# Patient Record
Sex: MALE | Race: BLACK OR AFRICAN AMERICAN | URBAN - METROPOLITAN AREA
[De-identification: names, ages, dates, MRNs, and addresses within clinical notes are randomized per-mention and may not be internally consistent; named-entity substitution may affect disease eponyms.]

---

## 2023-08-30 ENCOUNTER — HOSPITAL ENCOUNTER (EMERGENCY)
Age: 53
Discharge: HOME | End: 2023-08-31
Attending: STUDENT IN AN ORGANIZED HEALTH CARE EDUCATION/TRAINING PROGRAM
Payer: MEDICAID

## 2023-08-30 DIAGNOSIS — F10.920 ALCOHOLIC INTOXICATION WITHOUT COMPLICATION (HCC): Primary | ICD-10-CM

## 2023-08-30 LAB
ALBUMIN SERPL-MCNC: 4.1 G/DL (ref 3.4–5)
ALBUMIN/GLOB SERPL: 1.1 {RATIO} (ref 1.1–2.2)
ALP SERPL-CCNC: 67 U/L (ref 40–129)
ALT SERPL-CCNC: 22 U/L (ref 10–40)
AMPHETAMINES UR QL SCN>1000 NG/ML: NORMAL
ANION GAP SERPL CALCULATED.3IONS-SCNC: 15 MMOL/L (ref 3–16)
AST SERPL-CCNC: 27 U/L (ref 15–37)
BARBITURATES UR QL SCN>200 NG/ML: NORMAL
BASOPHILS # BLD: 0 K/UL (ref 0–0.2)
BASOPHILS NFR BLD: 0.5 %
BENZODIAZ UR QL SCN>200 NG/ML: NORMAL
BILIRUB SERPL-MCNC: <0.2 MG/DL (ref 0–1)
BILIRUB UR QL STRIP.AUTO: NEGATIVE
BUN SERPL-MCNC: 14 MG/DL (ref 7–20)
CALCIUM SERPL-MCNC: 9.2 MG/DL (ref 8.3–10.6)
CANNABINOIDS UR QL SCN>50 NG/ML: NORMAL
CHLORIDE SERPL-SCNC: 98 MMOL/L (ref 99–110)
CLARITY UR: CLEAR
CO2 SERPL-SCNC: 21 MMOL/L (ref 21–32)
COCAINE UR QL SCN: NORMAL
COLOR UR: YELLOW
CREAT SERPL-MCNC: 0.9 MG/DL (ref 0.9–1.3)
DEPRECATED RDW RBC AUTO: 14.5 % (ref 12.4–15.4)
DRUG SCREEN COMMENT UR-IMP: NORMAL
EOSINOPHIL # BLD: 0 K/UL (ref 0–0.6)
EOSINOPHIL NFR BLD: 0.3 %
ETHANOLAMINE SERPL-MCNC: 389 MG/DL (ref 0–0.08)
FENTANYL SCREEN, URINE: NORMAL
GFR SERPLBLD CREATININE-BSD FMLA CKD-EPI: >60 ML/MIN/{1.73_M2}
GLUCOSE SERPL-MCNC: 106 MG/DL (ref 70–99)
GLUCOSE UR STRIP.AUTO-MCNC: NEGATIVE MG/DL
HCT VFR BLD AUTO: 32.5 % (ref 40.5–52.5)
HGB BLD-MCNC: 11 G/DL (ref 13.5–17.5)
HGB UR QL STRIP.AUTO: NEGATIVE
KETONES UR STRIP.AUTO-MCNC: NEGATIVE MG/DL
LEUKOCYTE ESTERASE UR QL STRIP.AUTO: NEGATIVE
LYMPHOCYTES # BLD: 1.4 K/UL (ref 1–5.1)
LYMPHOCYTES NFR BLD: 30.5 %
MCH RBC QN AUTO: 29.7 PG (ref 26–34)
MCHC RBC AUTO-ENTMCNC: 33.8 G/DL (ref 31–36)
MCV RBC AUTO: 87.8 FL (ref 80–100)
METHADONE UR QL SCN>300 NG/ML: NORMAL
MONOCYTES # BLD: 0.6 K/UL (ref 0–1.3)
MONOCYTES NFR BLD: 13.4 %
NEUTROPHILS # BLD: 2.6 K/UL (ref 1.7–7.7)
NEUTROPHILS NFR BLD: 55.3 %
NITRITE UR QL STRIP.AUTO: NEGATIVE
OPIATES UR QL SCN>300 NG/ML: NORMAL
OXYCODONE UR QL SCN: NORMAL
PCP UR QL SCN>25 NG/ML: NORMAL
PH UR STRIP.AUTO: 6.5 [PH] (ref 5–8)
PH UR STRIP: 6.5 [PH]
PLATELET # BLD AUTO: 184 K/UL (ref 135–450)
PMV BLD AUTO: 8.4 FL (ref 5–10.5)
POTASSIUM SERPL-SCNC: 4.2 MMOL/L (ref 3.5–5.1)
PROT SERPL-MCNC: 7.7 G/DL (ref 6.4–8.2)
PROT UR STRIP.AUTO-MCNC: NEGATIVE MG/DL
RBC # BLD AUTO: 3.7 M/UL (ref 4.2–5.9)
RBC #/AREA URNS HPF: NORMAL /HPF (ref 0–4)
SODIUM SERPL-SCNC: 134 MMOL/L (ref 136–145)
SP GR UR STRIP.AUTO: <=1.005 (ref 1–1.03)
TROPONIN, HIGH SENSITIVITY: 8 NG/L (ref 0–22)
UA DIPSTICK W REFLEX MICRO PNL UR: NORMAL
URN SPEC COLLECT METH UR: NORMAL
UROBILINOGEN UR STRIP-ACNC: 0.2 E.U./DL
WBC # BLD AUTO: 4.6 K/UL (ref 4–11)
WBC #/AREA URNS HPF: NORMAL /HPF (ref 0–5)

## 2023-08-30 PROCEDURE — 99284 EMERGENCY DEPT VISIT MOD MDM: CPT

## 2023-08-30 PROCEDURE — 82077 ASSAY SPEC XCP UR&BREATH IA: CPT

## 2023-08-30 PROCEDURE — 6360000002 HC RX W HCPCS: Performed by: STUDENT IN AN ORGANIZED HEALTH CARE EDUCATION/TRAINING PROGRAM

## 2023-08-30 PROCEDURE — 96372 THER/PROPH/DIAG INJ SC/IM: CPT

## 2023-08-30 PROCEDURE — 2580000003 HC RX 258: Performed by: STUDENT IN AN ORGANIZED HEALTH CARE EDUCATION/TRAINING PROGRAM

## 2023-08-30 PROCEDURE — 85025 COMPLETE CBC W/AUTO DIFF WBC: CPT

## 2023-08-30 PROCEDURE — 96360 HYDRATION IV INFUSION INIT: CPT

## 2023-08-30 PROCEDURE — 84484 ASSAY OF TROPONIN QUANT: CPT

## 2023-08-30 PROCEDURE — 81001 URINALYSIS AUTO W/SCOPE: CPT

## 2023-08-30 PROCEDURE — 80053 COMPREHEN METABOLIC PANEL: CPT

## 2023-08-30 PROCEDURE — 36415 COLL VENOUS BLD VENIPUNCTURE: CPT

## 2023-08-30 RX ORDER — SODIUM CHLORIDE, SODIUM LACTATE, POTASSIUM CHLORIDE, AND CALCIUM CHLORIDE .6; .31; .03; .02 G/100ML; G/100ML; G/100ML; G/100ML
1000 INJECTION, SOLUTION INTRAVENOUS ONCE
Status: COMPLETED | OUTPATIENT
Start: 2023-08-30 | End: 2023-08-30

## 2023-08-30 RX ORDER — HALOPERIDOL 5 MG/ML
5 INJECTION INTRAMUSCULAR ONCE
Status: COMPLETED | OUTPATIENT
Start: 2023-08-30 | End: 2023-08-30

## 2023-08-30 RX ORDER — MIDAZOLAM HYDROCHLORIDE 1 MG/ML
5 INJECTION, SOLUTION INTRAMUSCULAR; INTRAVENOUS ONCE
Status: DISCONTINUED | OUTPATIENT
Start: 2023-08-30 | End: 2023-08-31

## 2023-08-30 RX ADMIN — SODIUM CHLORIDE, SODIUM LACTATE, POTASSIUM CHLORIDE, AND CALCIUM CHLORIDE 1000 ML: .6; .31; .03; .02 INJECTION, SOLUTION INTRAVENOUS at 22:45

## 2023-08-30 RX ADMIN — HALOPERIDOL LACTATE 5 MG: 5 INJECTION, SOLUTION INTRAMUSCULAR at 21:15

## 2023-08-30 RX ADMIN — HALOPERIDOL LACTATE 5 MG: 5 INJECTION, SOLUTION INTRAMUSCULAR at 20:45

## 2023-08-30 ASSESSMENT — PAIN - FUNCTIONAL ASSESSMENT: PAIN_FUNCTIONAL_ASSESSMENT: NONE - DENIES PAIN

## 2023-08-31 VITALS
HEART RATE: 77 BPM | WEIGHT: 162.6 LBS | OXYGEN SATURATION: 94 % | BODY MASS INDEX: 24.08 KG/M2 | RESPIRATION RATE: 18 BRPM | HEIGHT: 69 IN | DIASTOLIC BLOOD PRESSURE: 77 MMHG | SYSTOLIC BLOOD PRESSURE: 100 MMHG | TEMPERATURE: 98.1 F

## 2023-08-31 NOTE — ED NOTES
Pt discharged from ED in stable condition. Discharge instruction explain, all question answered. Prescription given. Pt walked to lobby independently. Patient Mother was contact and informed that patient was discharge.       Davis Cortes RN  08/31/23 2483

## 2023-08-31 NOTE — ED PROVIDER NOTES
ED Attending Attestation Note     Date of evaluation: 8/30/2023    This patient was seen by the resident. I have seen and examined the patient, agree with the workup, evaluation, management and diagnosis. The care plan has been discussed. I have reviewed the ECG and concur with the resident's interpretation. This is a patient with unknown past medical history of an unknown age who was found down with reported EtOH and other potential recreational drugs on board. Patient is unable to provide medical history as he is grossly altered at this time. Considerations include polysubstance intoxication, seizure with postictal state, trauma, primary psychiatric disease with psychosis. Patient is very agitated and required multiple doses of chemical sedation agents to maintain patient and staff safety. On exam, he is awake and alert but obviously responding to internal stimuli, he moves all 4 extremities spontaneously. Pupils are normal and briskly reactive bilaterally. CRITICAL CARE TIME    I have seen and examined this patient and provided 31 minutes of critical care time exclusive of separately billed procedures and treating other patients.   Critical care was necessary to treat or prevent imminent or life threatening deterioration of the following condition(s): Encephalopathy, agitation with psychosis    Critical care time was spent personally by me on the following activities: Evaluation of the patient, discussions with primary provider, administration of multiple pharmaceutical agents for management of agitation and psychosis, evaluation and response to therapy    MD Debra Brandt MD  08/30/23 8374

## 2023-08-31 NOTE — DISCHARGE INSTRUCTIONS
Do not drink alcohol to excess. This can be dangerous and possibly fatal.  Follow-up with your primary care provider from [facility].

## 2024-08-17 ENCOUNTER — APPOINTMENT (OUTPATIENT)
Dept: GENERAL RADIOLOGY | Age: 54
DRG: 053 | End: 2024-08-17
Payer: MEDICAID

## 2024-08-17 ENCOUNTER — HOSPITAL ENCOUNTER (INPATIENT)
Age: 54
LOS: 2 days | Discharge: HOME | DRG: 053 | End: 2024-08-19
Attending: INTERNAL MEDICINE | Admitting: INTERNAL MEDICINE
Payer: MEDICAID

## 2024-08-17 DIAGNOSIS — R56.9 SEIZURE (HCC): Primary | ICD-10-CM

## 2024-08-17 LAB
ALBUMIN SERPL-MCNC: 4 G/DL (ref 3.4–5)
ALBUMIN/GLOB SERPL: 1.1 {RATIO} (ref 1.1–2.2)
ALP SERPL-CCNC: 60 U/L (ref 40–129)
ALT SERPL-CCNC: 36 U/L (ref 10–40)
ANION GAP SERPL CALCULATED.3IONS-SCNC: 14 MMOL/L (ref 3–16)
AST SERPL-CCNC: 21 U/L (ref 15–37)
BASOPHILS # BLD: 0 K/UL (ref 0–0.2)
BASOPHILS NFR BLD: 0.3 %
BILIRUB SERPL-MCNC: <0.2 MG/DL (ref 0–1)
BUN SERPL-MCNC: 15 MG/DL (ref 7–20)
CALCIUM SERPL-MCNC: 8.6 MG/DL (ref 8.3–10.6)
CHLORIDE SERPL-SCNC: 103 MMOL/L (ref 99–110)
CO2 SERPL-SCNC: 19 MMOL/L (ref 21–32)
CREAT SERPL-MCNC: 1.2 MG/DL (ref 0.9–1.3)
DEPRECATED RDW RBC AUTO: 14.7 % (ref 12.4–15.4)
EOSINOPHIL # BLD: 0 K/UL (ref 0–0.6)
EOSINOPHIL NFR BLD: 0 %
ETHANOLAMINE SERPL-MCNC: 10 MG/DL (ref 0–0.08)
GFR SERPLBLD CREATININE-BSD FMLA CKD-EPI: 72 ML/MIN/{1.73_M2}
GLUCOSE SERPL-MCNC: 123 MG/DL (ref 70–99)
HCT VFR BLD AUTO: 44.8 % (ref 40.5–52.5)
HGB BLD-MCNC: 14.5 G/DL (ref 13.5–17.5)
LACTATE BLDV-SCNC: 1.7 MMOL/L (ref 0.4–2)
LEVETIRACETAM SERPL-MCNC: <2 UG/ML (ref 6–46)
LYMPHOCYTES # BLD: 0.3 K/UL (ref 1–5.1)
LYMPHOCYTES NFR BLD: 4.4 %
MCH RBC QN AUTO: 28.1 PG (ref 26–34)
MCHC RBC AUTO-ENTMCNC: 32.5 G/DL (ref 31–36)
MCV RBC AUTO: 86.3 FL (ref 80–100)
MEDICATION DOSE-MCNC: ABNORMAL
MONOCYTES # BLD: 0.6 K/UL (ref 0–1.3)
MONOCYTES NFR BLD: 8.4 %
NEUTROPHILS # BLD: 6.6 K/UL (ref 1.7–7.7)
NEUTROPHILS NFR BLD: 86.9 %
PLATELET # BLD AUTO: 191 K/UL (ref 135–450)
PMV BLD AUTO: 10 FL (ref 5–10.5)
POTASSIUM SERPL-SCNC: 4 MMOL/L (ref 3.5–5.1)
PROT SERPL-MCNC: 7.6 G/DL (ref 6.4–8.2)
RBC # BLD AUTO: 5.18 M/UL (ref 4.2–5.9)
SODIUM SERPL-SCNC: 136 MMOL/L (ref 136–145)
VALPROATE SERPL-MCNC: 16 UG/ML (ref 50–100)
WBC # BLD AUTO: 7.6 K/UL (ref 4–11)

## 2024-08-17 PROCEDURE — 2060000000 HC ICU INTERMEDIATE R&B

## 2024-08-17 PROCEDURE — 99285 EMERGENCY DEPT VISIT HI MDM: CPT

## 2024-08-17 PROCEDURE — 82077 ASSAY SPEC XCP UR&BREATH IA: CPT

## 2024-08-17 PROCEDURE — 80053 COMPREHEN METABOLIC PANEL: CPT

## 2024-08-17 PROCEDURE — 80203 DRUG SCREEN QUANT ZONISAMIDE: CPT

## 2024-08-17 PROCEDURE — 95717 EEG PHYS/QHP 2-12 HR W/O VID: CPT | Performed by: PSYCHIATRY & NEUROLOGY

## 2024-08-17 PROCEDURE — 80177 DRUG SCRN QUAN LEVETIRACETAM: CPT

## 2024-08-17 PROCEDURE — 71045 X-RAY EXAM CHEST 1 VIEW: CPT

## 2024-08-17 PROCEDURE — 83605 ASSAY OF LACTIC ACID: CPT

## 2024-08-17 PROCEDURE — 85025 COMPLETE CBC W/AUTO DIFF WBC: CPT

## 2024-08-17 PROCEDURE — 6360000002 HC RX W HCPCS: Performed by: PHYSICIAN ASSISTANT

## 2024-08-17 PROCEDURE — 36415 COLL VENOUS BLD VENIPUNCTURE: CPT

## 2024-08-17 PROCEDURE — 96375 TX/PRO/DX INJ NEW DRUG ADDON: CPT

## 2024-08-17 PROCEDURE — 6360000002 HC RX W HCPCS

## 2024-08-17 PROCEDURE — 96374 THER/PROPH/DIAG INJ IV PUSH: CPT

## 2024-08-17 PROCEDURE — 80164 ASSAY DIPROPYLACETIC ACD TOT: CPT

## 2024-08-17 RX ORDER — LORAZEPAM 2 MG/ML
INJECTION INTRAMUSCULAR
Status: COMPLETED
Start: 2024-08-17 | End: 2024-08-17

## 2024-08-17 RX ORDER — LORAZEPAM 2 MG/ML
1 INJECTION INTRAMUSCULAR
Status: ACTIVE | OUTPATIENT
Start: 2024-08-17 | End: 2024-08-18

## 2024-08-17 RX ORDER — LORAZEPAM 0.5 MG/1
0.5 TABLET ORAL EVERY 6 HOURS PRN
Status: DISCONTINUED | OUTPATIENT
Start: 2024-08-17 | End: 2024-08-19 | Stop reason: HOSPADM

## 2024-08-17 RX ORDER — LORAZEPAM 0.5 MG/1
0.5 TABLET ORAL 3 TIMES DAILY
COMMUNITY

## 2024-08-17 RX ORDER — SODIUM CHLORIDE 9 MG/ML
INJECTION, SOLUTION INTRAVENOUS PRN
Status: DISCONTINUED | OUTPATIENT
Start: 2024-08-17 | End: 2024-08-19 | Stop reason: HOSPADM

## 2024-08-17 RX ORDER — ZONISAMIDE 100 MG/1
200 CAPSULE ORAL NIGHTLY
COMMUNITY

## 2024-08-17 RX ORDER — DIVALPROEX SODIUM 250 MG/1
250 TABLET, EXTENDED RELEASE ORAL 2 TIMES DAILY
Status: DISCONTINUED | OUTPATIENT
Start: 2024-08-17 | End: 2024-08-17

## 2024-08-17 RX ORDER — LORAZEPAM 2 MG/ML
1 INJECTION INTRAMUSCULAR
COMMUNITY

## 2024-08-17 RX ORDER — ERGOCALCIFEROL 1.25 MG/1
50000 CAPSULE ORAL WEEKLY
Status: DISCONTINUED | OUTPATIENT
Start: 2024-08-23 | End: 2024-08-19 | Stop reason: HOSPADM

## 2024-08-17 RX ORDER — ERGOCALCIFEROL 1.25 MG/1
50000 CAPSULE ORAL WEEKLY
COMMUNITY

## 2024-08-17 RX ORDER — SODIUM CHLORIDE 0.9 % (FLUSH) 0.9 %
5-40 SYRINGE (ML) INJECTION EVERY 12 HOURS SCHEDULED
Status: DISCONTINUED | OUTPATIENT
Start: 2024-08-17 | End: 2024-08-19 | Stop reason: HOSPADM

## 2024-08-17 RX ORDER — POTASSIUM CHLORIDE 7.45 MG/ML
10 INJECTION INTRAVENOUS PRN
Status: DISCONTINUED | OUTPATIENT
Start: 2024-08-17 | End: 2024-08-19 | Stop reason: HOSPADM

## 2024-08-17 RX ORDER — SODIUM CHLORIDE 0.9 % (FLUSH) 0.9 %
5-40 SYRINGE (ML) INJECTION PRN
Status: DISCONTINUED | OUTPATIENT
Start: 2024-08-17 | End: 2024-08-19 | Stop reason: HOSPADM

## 2024-08-17 RX ORDER — ALPRAZOLAM 0.5 MG/1
1 TABLET ORAL
Status: DISCONTINUED | OUTPATIENT
Start: 2024-08-17 | End: 2024-08-17 | Stop reason: ALTCHOICE

## 2024-08-17 RX ORDER — MAGNESIUM SULFATE IN WATER 40 MG/ML
2000 INJECTION, SOLUTION INTRAVENOUS PRN
Status: DISCONTINUED | OUTPATIENT
Start: 2024-08-17 | End: 2024-08-19 | Stop reason: HOSPADM

## 2024-08-17 RX ORDER — LEVETIRACETAM 500 MG/5ML
500 INJECTION, SOLUTION, CONCENTRATE INTRAVENOUS EVERY 12 HOURS
Status: DISCONTINUED | OUTPATIENT
Start: 2024-08-17 | End: 2024-08-19

## 2024-08-17 RX ORDER — LEVETIRACETAM 500 MG/5ML
1000 INJECTION, SOLUTION, CONCENTRATE INTRAVENOUS ONCE
Status: COMPLETED | OUTPATIENT
Start: 2024-08-17 | End: 2024-08-17

## 2024-08-17 RX ORDER — ACETAMINOPHEN 325 MG/1
650 TABLET ORAL EVERY 6 HOURS PRN
Status: DISCONTINUED | OUTPATIENT
Start: 2024-08-17 | End: 2024-08-19 | Stop reason: HOSPADM

## 2024-08-17 RX ORDER — LEVETIRACETAM 500 MG/1
500 TABLET ORAL 2 TIMES DAILY
COMMUNITY

## 2024-08-17 RX ORDER — DEXAMETHASONE 4 MG/1
6 TABLET ORAL DAILY
Status: DISCONTINUED | OUTPATIENT
Start: 2024-08-18 | End: 2024-08-17

## 2024-08-17 RX ORDER — LEVETIRACETAM 10 MG/ML
1000 INJECTION INTRAVASCULAR ONCE
Status: DISCONTINUED | OUTPATIENT
Start: 2024-08-17 | End: 2024-08-17

## 2024-08-17 RX ORDER — ACETAMINOPHEN 650 MG/1
650 SUPPOSITORY RECTAL EVERY 6 HOURS PRN
Status: DISCONTINUED | OUTPATIENT
Start: 2024-08-17 | End: 2024-08-19 | Stop reason: HOSPADM

## 2024-08-17 RX ORDER — LORAZEPAM 2 MG/ML
2 INJECTION INTRAMUSCULAR ONCE
Status: COMPLETED | OUTPATIENT
Start: 2024-08-17 | End: 2024-08-17

## 2024-08-17 RX ORDER — DIVALPROEX SODIUM 250 MG/1
250 TABLET, EXTENDED RELEASE ORAL 2 TIMES DAILY
COMMUNITY

## 2024-08-17 RX ORDER — ENOXAPARIN SODIUM 100 MG/ML
40 INJECTION SUBCUTANEOUS DAILY
Status: DISCONTINUED | OUTPATIENT
Start: 2024-08-18 | End: 2024-08-19 | Stop reason: HOSPADM

## 2024-08-17 RX ORDER — TRAZODONE HYDROCHLORIDE 50 MG/1
50 TABLET ORAL NIGHTLY
Status: DISCONTINUED | OUTPATIENT
Start: 2024-08-17 | End: 2024-08-19 | Stop reason: HOSPADM

## 2024-08-17 RX ORDER — LORAZEPAM 1 MG/1
2 TABLET ORAL ONCE
Status: DISCONTINUED | OUTPATIENT
Start: 2024-08-17 | End: 2024-08-17

## 2024-08-17 RX ORDER — ALPRAZOLAM 1 MG/1
1 TABLET ORAL
Status: ON HOLD | COMMUNITY
End: 2024-08-17

## 2024-08-17 RX ORDER — POLYETHYLENE GLYCOL 3350 17 G/17G
17 POWDER, FOR SOLUTION ORAL DAILY PRN
Status: DISCONTINUED | OUTPATIENT
Start: 2024-08-17 | End: 2024-08-19 | Stop reason: HOSPADM

## 2024-08-17 RX ORDER — POTASSIUM CHLORIDE 20 MEQ/1
40 TABLET, EXTENDED RELEASE ORAL PRN
Status: DISCONTINUED | OUTPATIENT
Start: 2024-08-17 | End: 2024-08-19 | Stop reason: HOSPADM

## 2024-08-17 RX ORDER — ZONISAMIDE 100 MG/1
200 CAPSULE ORAL NIGHTLY
Status: DISCONTINUED | OUTPATIENT
Start: 2024-08-17 | End: 2024-08-19 | Stop reason: HOSPADM

## 2024-08-17 RX ORDER — ONDANSETRON 2 MG/ML
4 INJECTION INTRAMUSCULAR; INTRAVENOUS EVERY 6 HOURS PRN
Status: DISCONTINUED | OUTPATIENT
Start: 2024-08-17 | End: 2024-08-19 | Stop reason: HOSPADM

## 2024-08-17 RX ORDER — LEVETIRACETAM 500 MG/1
500 TABLET ORAL 2 TIMES DAILY
Status: DISCONTINUED | OUTPATIENT
Start: 2024-08-17 | End: 2024-08-17

## 2024-08-17 RX ORDER — DEXAMETHASONE SODIUM PHOSPHATE 10 MG/ML
6 INJECTION, SOLUTION INTRAMUSCULAR; INTRAVENOUS DAILY
Status: DISCONTINUED | OUTPATIENT
Start: 2024-08-18 | End: 2024-08-19

## 2024-08-17 RX ORDER — ONDANSETRON 4 MG/1
4 TABLET, ORALLY DISINTEGRATING ORAL EVERY 8 HOURS PRN
Status: DISCONTINUED | OUTPATIENT
Start: 2024-08-17 | End: 2024-08-19 | Stop reason: HOSPADM

## 2024-08-17 RX ORDER — TRAZODONE HYDROCHLORIDE 50 MG/1
50 TABLET ORAL NIGHTLY
COMMUNITY

## 2024-08-17 RX ORDER — DEXAMETHASONE SODIUM PHOSPHATE 4 MG/ML
6 INJECTION, SOLUTION INTRA-ARTICULAR; INTRALESIONAL; INTRAMUSCULAR; INTRAVENOUS; SOFT TISSUE ONCE
Status: COMPLETED | OUTPATIENT
Start: 2024-08-17 | End: 2024-08-17

## 2024-08-17 RX ADMIN — LORAZEPAM 2 MG: 2 INJECTION INTRAMUSCULAR at 15:40

## 2024-08-17 RX ADMIN — DEXAMETHASONE SODIUM PHOSPHATE 6 MG: 4 INJECTION INTRA-ARTICULAR; INTRALESIONAL; INTRAMUSCULAR; INTRAVENOUS; SOFT TISSUE at 20:11

## 2024-08-17 RX ADMIN — LEVETIRACETAM 1000 MG: 100 INJECTION INTRAVENOUS at 17:51

## 2024-08-17 RX ADMIN — LORAZEPAM 2 MG: 2 INJECTION INTRAMUSCULAR; INTRAVENOUS at 15:40

## 2024-08-17 ASSESSMENT — PAIN - FUNCTIONAL ASSESSMENT: PAIN_FUNCTIONAL_ASSESSMENT: NONE - DENIES PAIN

## 2024-08-17 NOTE — ED NOTES
Headband: adjusted  Date/Time: 08/17/2024 @ 1547  Recorder: recording restarted  Skin: intact  Highest Seizure [place] Percentage past hour: 30%      General info regarding Seizure [place] %:  Minimum duration of study is 2 hours. If Seizure [place] has remained 0% throughout the entire 2-hour duration, communicate with provider to stop the recording.     Seizure [place] 0-10% - Continue to monitor and complete 2-hour study.  Seizure [place] 11-89% - Epileptiform activity present. Notify provider for next steps.  Seizure [place] >/= 90% - Epileptiform activity consistent with Status Epilepticus. Immediately notify provider.     *Patients with Seizure [place] above 10% that persists may require a study longer than 2 hours. Maximum recording duration is 24 hours. Please update provider with a persistent increase in Seizure [place] above 10%.

## 2024-08-17 NOTE — ED NOTES
Pt unwilling to let this RN catheterize patient for urine sample.    Pt oxygen saturation 87% RA while sleeping. Pt unwilling to let this RN place nasal cannula on.

## 2024-08-17 NOTE — ED PROVIDER NOTES
**ADVANCED PRACTICE PROVIDER, I HAVE EVALUATED THIS PATIENT**        [facility] EMERGENCY DEPARTMENT  EMERGENCY DEPARTMENT ENCOUNTER      Pt Name: Francisco Espinoza  MRN:[id number]  Birthdate 1970  Date of evaluation: 8/17/2024  Provider: Chidi Duckworth PA-C  Note Started: 2:07 PM EDT 8/17/24        Chief Complaint:    Chief Complaint   Patient presents with    Seizures     C/o 60 second seizure with postictal phase. Pt was recently diagnosed with COVID two days ago. Pt is not postictal in triage but does not verbally answer questions. Pt hx includes TBI, epilepsy, ETOH dependence, schizophrenia and cognitive delay. Recent admission for ETOH withdrawal and seizure activity.         Nursing Notes, Past Medical Hx, Past Surgical Hx, Social Hx, Allergies, and Family Hx were all reviewed and agreed with or any disagreements were addressed in the HPI.    HPI: (Location, Duration, Timing, Severity, Quality, Assoc Sx, Context, Modifying factors)    History From: Nursing home and EMS  Limitations to history : None    Social Determinants Significantly Affecting Health : None    Chief Complaint of seizure.  Patient had a seizure that lasted about a minute.  He is from [facility].  Also recently diagnosed with COVID 2 days ago.  He he denies headache, no chest pain, no nausea vomiting.  No lightheadedness.  No abdominal pain.  He will look up and she.  But he will not say much.  History of TBI, cerebral palsy, epilepsy, neurocognitive disorder and simple schizophrenia.  He is on zonisamide and Keppra for his seizures.    This is a  54 y.o. male who presents to emergency room with the above complaint    PastMedical/Surgical History:      Diagnosis Date    Acute alcoholism (HCC)     Anemia, unspecified     Anxiety disorder     Avitaminosis D     Cerebral palsy (HCC)     Epilepsy with altered consciousness with intractable epilepsy (HCC)     Insomnia, unspecified     Neurocognitive disorder

## 2024-08-17 NOTE — ED TRIAGE NOTES
Pt to ER from [facility] via EMS C/o 60 second seizure with postictal phase. Pt was recently diagnosed with COVID two days ago. Pt is not postictal in triage but does not verbally answer questions. Pt hx includes TBI, epilepsy, ETOH dependence, schizophrenia and cognitive delay. Recent admission for ETOH withdrawal and seizure activity.

## 2024-08-17 NOTE — PROGRESS NOTES
Pharmacy Medication Reconciliation Note     List of medications patient is currently taking is NOT complete.    Source of information:   1. [facility] Summary    Notes regarding home medications:   Medication list/ doses are accurate with summary list. Could not get a hold of a nurse to see when medications were last taken.      Mitzy Orozco, Pharmacy Intern  8/17/2024 5:38 PM

## 2024-08-17 NOTE — ED NOTES
Headband: applied  Date/Time: 08/17/2024 @ 1432  Recorder: recording started  Skin: intact  Highest Seizure [place] Percentage past hour:       General info regarding Seizure [place] %:  Minimum duration of study is 2 hours. If Seizure [place] has remained 0% throughout the entire 2-hour duration, communicate with provider to stop the recording.     Seizure [place] 0-10% - Continue to monitor and complete 2-hour study.  Seizure [place] 11-89% - Epileptiform activity present. Notify provider for next steps.  Seizure [place] >/= 90% - Epileptiform activity consistent with Status Epilepticus. Immediately notify provider.     *Patients with Seizure [place] above 10% that persists may require a study longer than 2 hours. Maximum recording duration is 24 hours. Please update provider with a persistent increase in Seizure [place] above 10%.

## 2024-08-17 NOTE — PROGRESS NOTES
Hospital Medicine History & Physical      PCP: No primary care provider on file.    Date of Admission: 8/17/2024    Date of Service: Pt seen/examined on 08/17/2024 and Admitted to Inpatient with expected LOS greater than two midnights due to medical therapy.     Chief Complaint: Seizure      History Of Present Illness:    54 y.o. male who presented to [facility] with seizures, patient from group home apparently had an episode of seizure there transferred to ED had another episode patient tested positive for COVID couple days ago on presentation his oxygen is 87 to 88% patient very poor historian he follows very simple commands but most of the command he does not follow to history taking history was taken from provider and chart, neurology were consulted by ED provider and okay to admit at [facility], will admit further to the hospital for further management and treatment.    Past Medical History:          Diagnosis Date    Acute alcoholism ([facility])     Anemia, unspecified     Anxiety disorder     Avitaminosis D     Cerebral palsy ([facility])     Epilepsy with altered consciousness with intractable epilepsy ([facility])     Insomnia, unspecified     Neurocognitive disorder     Noncompliance with medication regimen     Psychoactive substance abuse ([facility])     Simple schizophrenia, subchronic condition ([facility])     TBI (traumatic brain injury) ([facility])        Past Surgical History:      History reviewed. No pertinent surgical history.    Medications Prior to Admission:      Prior to Admission medications    Medication Sig Start Date End Date Taking? Authorizing Provider   ALPRAZolam (XANAX) 1 MG tablet Take 1 tablet by mouth every 16 hours. Indications: Seizure 1 tablet every 15 hours as needed for repeat x2 for acute seizure   Yes Wily Kirby MD   LORazepam (ATIVAN) 0.5 MG tablet Take 1 tablet by mouth in the morning, at noon, and at bedtime. Max Daily Amount: 1.5 mg   Yes Wily Kirby MD   LORazepam (ATIVAN) 2 MG/ML

## 2024-08-17 NOTE — ED NOTES
This RN heard snoring respirations from hallway and went to check pt. Pt was actively having a seizure, with eyes rolled back in head, snoring respirations, oxygen saturation drop, tachycardic in the 150s and slobbering. Staff assist button pushed by this RN, Charge RN in room. Pt turned on side and suctioned. Ativan per verbal order given (SEE MAR). Pt postictal at this time. 2L nasal cannula placed on patient. Seizure lasted approximately 30 seconds. Ceribell replaced and restarted.

## 2024-08-18 LAB
25(OH)D3 SERPL-MCNC: 40 NG/ML
ALBUMIN SERPL-MCNC: 4.3 G/DL (ref 3.4–5)
ALBUMIN/GLOB SERPL: 1.2 {RATIO} (ref 1.1–2.2)
ALP SERPL-CCNC: 57 U/L (ref 40–129)
ALT SERPL-CCNC: 36 U/L (ref 10–40)
AMPHETAMINES UR QL SCN>1000 NG/ML: ABNORMAL
ANION GAP SERPL CALCULATED.3IONS-SCNC: 14 MMOL/L (ref 3–16)
AST SERPL-CCNC: 27 U/L (ref 15–37)
BACTERIA URNS QL MICRO: NORMAL /HPF
BARBITURATES UR QL SCN>200 NG/ML: ABNORMAL
BASOPHILS # BLD: 0 K/UL (ref 0–0.2)
BASOPHILS NFR BLD: 0.3 %
BENZODIAZ UR QL SCN>200 NG/ML: POSITIVE
BILIRUB SERPL-MCNC: 0.3 MG/DL (ref 0–1)
BILIRUB UR QL STRIP.AUTO: NEGATIVE
BUN SERPL-MCNC: 18 MG/DL (ref 7–20)
CALCIUM SERPL-MCNC: 8.9 MG/DL (ref 8.3–10.6)
CANNABINOIDS UR QL SCN>50 NG/ML: POSITIVE
CHLORIDE SERPL-SCNC: 106 MMOL/L (ref 99–110)
CLARITY UR: CLEAR
CO2 SERPL-SCNC: 21 MMOL/L (ref 21–32)
COCAINE UR QL SCN: ABNORMAL
COLOR UR: YELLOW
CREAT SERPL-MCNC: 1.1 MG/DL (ref 0.9–1.3)
CRP SERPL-MCNC: 49.3 MG/L (ref 0–5.1)
DEPRECATED RDW RBC AUTO: 14.5 % (ref 12.4–15.4)
DRUG SCREEN COMMENT UR-IMP: ABNORMAL
EOSINOPHIL # BLD: 0 K/UL (ref 0–0.6)
EOSINOPHIL NFR BLD: 0 %
EPI CELLS #/AREA URNS AUTO: 1 /HPF (ref 0–5)
FENTANYL SCREEN, URINE: ABNORMAL
GFR SERPLBLD CREATININE-BSD FMLA CKD-EPI: 80 ML/MIN/{1.73_M2}
GLUCOSE BLD-MCNC: 84 MG/DL (ref 70–99)
GLUCOSE BLD-MCNC: 86 MG/DL (ref 70–99)
GLUCOSE BLD-MCNC: 95 MG/DL (ref 70–99)
GLUCOSE SERPL-MCNC: 108 MG/DL (ref 70–99)
GLUCOSE UR STRIP.AUTO-MCNC: NEGATIVE MG/DL
HCT VFR BLD AUTO: 43.1 % (ref 40.5–52.5)
HGB BLD-MCNC: 14.5 G/DL (ref 13.5–17.5)
HGB UR QL STRIP.AUTO: NEGATIVE
HYALINE CASTS #/AREA URNS AUTO: 1 /LPF (ref 0–8)
KETONES UR STRIP.AUTO-MCNC: ABNORMAL MG/DL
LEUKOCYTE ESTERASE UR QL STRIP.AUTO: NEGATIVE
LYMPHOCYTES # BLD: 0.9 K/UL (ref 1–5.1)
LYMPHOCYTES NFR BLD: 18.9 %
MCH RBC QN AUTO: 28.5 PG (ref 26–34)
MCHC RBC AUTO-ENTMCNC: 33.6 G/DL (ref 31–36)
MCV RBC AUTO: 84.9 FL (ref 80–100)
METHADONE UR QL SCN>300 NG/ML: ABNORMAL
MONOCYTES # BLD: 0.5 K/UL (ref 0–1.3)
MONOCYTES NFR BLD: 10 %
NEUTROPHILS # BLD: 3.5 K/UL (ref 1.7–7.7)
NEUTROPHILS NFR BLD: 70.8 %
NITRITE UR QL STRIP.AUTO: NEGATIVE
OPIATES UR QL SCN>300 NG/ML: ABNORMAL
OXYCODONE UR QL SCN: ABNORMAL
PCP UR QL SCN>25 NG/ML: ABNORMAL
PERFORMED ON: NORMAL
PH UR STRIP.AUTO: 5.5 [PH] (ref 5–8)
PH UR STRIP: 6 [PH]
PLATELET # BLD AUTO: 172 K/UL (ref 135–450)
PMV BLD AUTO: 9.4 FL (ref 5–10.5)
POTASSIUM SERPL-SCNC: 4.2 MMOL/L (ref 3.5–5.1)
PROT SERPL-MCNC: 8 G/DL (ref 6.4–8.2)
PROT UR STRIP.AUTO-MCNC: ABNORMAL MG/DL
RBC # BLD AUTO: 5.08 M/UL (ref 4.2–5.9)
RBC CLUMPS #/AREA URNS AUTO: 0 /HPF (ref 0–4)
SODIUM SERPL-SCNC: 141 MMOL/L (ref 136–145)
SP GR UR STRIP.AUTO: 1.03 (ref 1–1.03)
UA COMPLETE W REFLEX CULTURE PNL UR: ABNORMAL
UA DIPSTICK W REFLEX MICRO PNL UR: YES
URN SPEC COLLECT METH UR: ABNORMAL
UROBILINOGEN UR STRIP-ACNC: 0.2 E.U./DL
WBC # BLD AUTO: 4.9 K/UL (ref 4–11)
WBC #/AREA URNS AUTO: 1 /HPF (ref 0–5)

## 2024-08-18 PROCEDURE — 86140 C-REACTIVE PROTEIN: CPT

## 2024-08-18 PROCEDURE — 2500000003 HC RX 250 WO HCPCS: Performed by: NURSE PRACTITIONER

## 2024-08-18 PROCEDURE — 82306 VITAMIN D 25 HYDROXY: CPT

## 2024-08-18 PROCEDURE — 2580000003 HC RX 258: Performed by: NURSE PRACTITIONER

## 2024-08-18 PROCEDURE — 80053 COMPREHEN METABOLIC PANEL: CPT

## 2024-08-18 PROCEDURE — 80307 DRUG TEST PRSMV CHEM ANLYZR: CPT

## 2024-08-18 PROCEDURE — 6360000002 HC RX W HCPCS: Performed by: INTERNAL MEDICINE

## 2024-08-18 PROCEDURE — 81001 URINALYSIS AUTO W/SCOPE: CPT

## 2024-08-18 PROCEDURE — 6370000000 HC RX 637 (ALT 250 FOR IP): Performed by: INTERNAL MEDICINE

## 2024-08-18 PROCEDURE — 85025 COMPLETE CBC W/AUTO DIFF WBC: CPT

## 2024-08-18 PROCEDURE — 2580000003 HC RX 258: Performed by: HOSPITALIST

## 2024-08-18 PROCEDURE — 2060000000 HC ICU INTERMEDIATE R&B

## 2024-08-18 PROCEDURE — 6360000002 HC RX W HCPCS: Performed by: NURSE PRACTITIONER

## 2024-08-18 PROCEDURE — 2580000003 HC RX 258: Performed by: INTERNAL MEDICINE

## 2024-08-18 PROCEDURE — 36415 COLL VENOUS BLD VENIPUNCTURE: CPT

## 2024-08-18 RX ORDER — DEXTROSE MONOHYDRATE AND SODIUM CHLORIDE 5; .9 G/100ML; G/100ML
INJECTION, SOLUTION INTRAVENOUS CONTINUOUS
Status: DISCONTINUED | OUTPATIENT
Start: 2024-08-18 | End: 2024-08-19

## 2024-08-18 RX ADMIN — ACETAMINOPHEN 650 MG: 325 TABLET ORAL at 21:17

## 2024-08-18 RX ADMIN — VALPROATE SODIUM 250 MG: 100 INJECTION, SOLUTION INTRAVENOUS at 12:07

## 2024-08-18 RX ADMIN — LEVETIRACETAM 500 MG: 100 INJECTION INTRAVENOUS at 00:18

## 2024-08-18 RX ADMIN — LEVETIRACETAM 500 MG: 100 INJECTION INTRAVENOUS at 12:01

## 2024-08-18 RX ADMIN — SODIUM CHLORIDE, PRESERVATIVE FREE 10 ML: 5 INJECTION INTRAVENOUS at 09:30

## 2024-08-18 RX ADMIN — DEXAMETHASONE SODIUM PHOSPHATE 6 MG: 10 INJECTION, SOLUTION INTRAMUSCULAR; INTRAVENOUS at 21:12

## 2024-08-18 RX ADMIN — ZONISAMIDE 200 MG: 100 CAPSULE ORAL at 21:10

## 2024-08-18 RX ADMIN — DEXTROSE AND SODIUM CHLORIDE: 5; 900 INJECTION, SOLUTION INTRAVENOUS at 16:00

## 2024-08-18 RX ADMIN — VALPROATE SODIUM 250 MG: 100 INJECTION, SOLUTION INTRAVENOUS at 00:30

## 2024-08-18 RX ADMIN — ENOXAPARIN SODIUM 40 MG: 100 INJECTION SUBCUTANEOUS at 09:30

## 2024-08-18 RX ADMIN — SODIUM CHLORIDE, PRESERVATIVE FREE 10 ML: 5 INJECTION INTRAVENOUS at 01:31

## 2024-08-18 RX ADMIN — SODIUM CHLORIDE, PRESERVATIVE FREE 10 ML: 5 INJECTION INTRAVENOUS at 00:20

## 2024-08-18 ASSESSMENT — PAIN SCALES - GENERAL
PAINLEVEL_OUTOF10: 0
PAINLEVEL_OUTOF10: 0
PAINLEVEL_OUTOF10: 8

## 2024-08-18 ASSESSMENT — PAIN DESCRIPTION - ORIENTATION: ORIENTATION: RIGHT;LOWER;MID

## 2024-08-18 ASSESSMENT — PAIN DESCRIPTION - LOCATION: LOCATION: OTHER (COMMENT)

## 2024-08-18 ASSESSMENT — PAIN DESCRIPTION - DESCRIPTORS: DESCRIPTORS: ACHING

## 2024-08-18 ASSESSMENT — PAIN - FUNCTIONAL ASSESSMENT: PAIN_FUNCTIONAL_ASSESSMENT: ACTIVITIES ARE NOT PREVENTED

## 2024-08-18 NOTE — FLOWSHEET NOTE
08/17/24 2041   Vital Signs   Temp 99.3 °F (37.4 °C)   Temp Source Oral   Pulse (!) 103   Heart Rate Source Monitor   Respirations 18   /80   MAP (Calculated) 93   MAP (mmHg) 93   BP Location Right upper arm   BP Method Automatic   Patient Position Semi fowlers   Pain Assessment   Pain Assessment None - Denies Pain   Opioid-Induced Sedation   POSS Score (!) 3   RASS   [place] Agitation Sedation Scale (RASS) -3   Oxygen Therapy   SpO2 99 %   O2 Device Nasal cannula   O2 Flow Rate (L/min) 2 L/min     Patient is admitted to room 5279 from the emergency room. Patient has arrived to the floor at this time via stretcher and transferred to bed. Call light and bedside table within reach. Patient rates a HIGH fall risk. Not alert, see flow sheet.  No orientation status known at this time. Pt is COVID + from Group Home.  Placing on TELE at this time/per order.  Arrived to unit brief wet/urine, perineal care provided and brief changed, minimal assist with turning but not difficult d/t pt stature.  Pt placed in seizure and droplet + precautions.  No s/s off distress noted, call light within reach. Virtual  ordered and implemented at this time. Velma Bowen RN

## 2024-08-18 NOTE — PROGRESS NOTES
Hospitalist Progress Note  8/18/2024 8:29 AM    PCP: No primary care provider on file.    [phone]     Date of Admission: 8/17/2024                                                                                                                     HOSPITAL COURSE    Patient demographics:  The patient  Francisco Espinoza is a 54 y.o. male     Significant past medical history:   Patient Active Problem List   Diagnosis    Seizure (HCC)         Presenting symptoms:      Diagnostic workup:      CONSULTS DURING ADMISSION :   IP CONSULT TO NEUROLOGY      Patient was diagnosed with:        Treatment while inpatient:                                                                                         ----------------------------------------------------------      SUBJECTIVE COMPLAINTS-     Diet: ADULT DIET; Regular      OBJECTIVE:   Patient Active Problem List   Diagnosis    Seizure (HCC)       Allergies  Patient has no known allergies.    Medications    Scheduled Meds:   [START ON 8/23/2024] vitamin D  50,000 Units Oral Weekly    [Held by provider] traZODone  50 mg Oral Nightly    zonisamide  200 mg Oral Nightly    sodium chloride flush  5-40 mL IntraVENous 2 times per day    enoxaparin  40 mg SubCUTAneous Daily    levETIRAcetam  500 mg IntraVENous Q12H    dexAMETHasone  6 mg IntraVENous Daily    valproate (DEPACON) 250 mg in sodium chloride 0.9 % 100 mL IVPB  250 mg IntraVENous Q12H     Continuous Infusions:   sodium chloride       PRN Meds:  [Held by provider] LORazepam, LORazepam, sodium chloride flush, sodium chloride, potassium chloride **OR** potassium alternative oral replacement **OR** potassium chloride, magnesium sulfate, ondansetron **OR** ondansetron, polyethylene glycol, acetaminophen **OR** acetaminophen    Vitals   Vitals /wt Patient Vitals for the past 8 hrs:   BP Temp Temp src Pulse Resp SpO2 Weight   08/18/24 0750 120/85 98.1 °F (36.7 °C) -- 96 18 96 % --   08/18/24 0559 120/81 99 °F (37.2 °C) Oral 98

## 2024-08-18 NOTE — PLAN OF CARE
Problem: Discharge Planning  Goal: Discharge to home or other facility with appropriate resources  Outcome: Progressing  Flowsheets (Taken 8/18/2024 0032)  Discharge to home or other facility with appropriate resources:   Identify barriers to discharge with patient and caregiver   Arrange for needed discharge resources and transportation as appropriate   Identify discharge learning needs (meds, wound care, etc)   Refer to discharge planning if patient needs post-hospital services based on physician order or complex needs related to functional status, cognitive ability or social support system     Problem: Pain  Goal: Verbalizes/displays adequate comfort level or baseline comfort level  Outcome: Progressing     Problem: ABCDS Injury Assessment  Goal: Absence of physical injury  Outcome: Progressing

## 2024-08-18 NOTE — CONSULTS
Neurology Consult Note  Reason for Consult: consult for breakthrough seizure    Chief complaint: nothing    Dr Choi, Kaylee Curtis MD asked me to see Francisco Espinoza in consultation for evaluation of consult for breakthrough seizure    History of Present Illness:  Francisco Espinoza is a 54 y.o. male who presents with seizure.     I obtained my information via chart review.  The patient is not participating well w/ exam due to drowsiness.     It is my understanding that the patient was at a post acute care facility when he apparently had a 60 second seizure that is not otherwise described.  He was subsequently transported to the ED to be evaluated.      BP was OK.  No fever.  No neuroimaging was obtained.  Rapid EEG w/ 0% seizure burden.  Apparently he was diagnosed w/ COVID 2 days ago.  Keppra level undetectable.  He was loaded w/ 1g LEV.     Currently he is drowsy though easily able to be aroused and does participate some w/ exam.  No further seizures.     Per chart he does have a hx of TBI, seizure, cognitive impairment, non compliance, and alcohol use.  He has had other admissions/evaluations for seizure in the context of non compliance and alcohol.      It appears [facility].  Last seen in January.  He is noted to have both epileptic and non epileptic seizures.     Per chart:  \"Description: rising sensation, smell or taste change (unable to describe), activity arrest, blacks out, makes a sound, turns to the left, starts shaking, had tongue bite, loses control of the bladder, confused after.  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

## 2024-08-18 NOTE — FLOWSHEET NOTE
08/18/24 0032   Assessment   Charting Type Admission   Psychosocial   Psychosocial (WDL) X   Patient Behaviors Lethargic;Not interactive;Sleeping   Neurological   Level of Consciousness 1   Swallow Screening   Is the patient unable to remain alert for testing? (!) Yes   [place] Coma Scale   Eye Opening 4   Best Verbal Response 5   Best Motor Response 4   [place] Coma Scale Score 13   NIHSS Stroke Scale   NIHSS Stroke Scale Assessed No   HEENT (Head, Ears, Eyes, Nose, & Throat)   HEENT (WDL) WDL   Respiratory   Respiratory (WDL) WDL   Respiratory Interventions H.O.B. elevated   Respiratory Pattern Regular   Respiratory Depth Normal   Respiratory Quality/Effort Unlabored   Chest Assessment Chest expansion symmetrical;Trachea midline   L Breath Sounds Clear   R Breath Sounds Clear   Cardiac   Cardiac (WDL) WDL   Cardiac Regularity Regular   Heart Sounds S1, S2   Implanted Cardiac Device (Pacemaker, ICD, PA Sensor) No   Rhythm Interpretation   Pulse 98   Cardiac Monitor   Cardiac/Telemetry Monitor On Bedside monitor in use   Alarm Audible Centralized cardiac monitoring   Alarms Set Centralized cardiac monitoring   Telemetry Monitor Alarm Parameters    Electrodes Replaced Yes   Gastrointestinal   Abdominal (WDL) WDL   Genitourinary   Suprapubic Tenderness SMILEY   Dysuria (Pain/Burning w/Urination) SMILEY   Difficulty Urinating/Starting Stream SMILEY   Peripheral Vascular   Peripheral Vascular (WDL) WDL   Edema None   Skin Integumentary    Skin Integumentary (WDL) WDL   Wound Prevention and Protection Methods   Location of Wound Prevention Generalized   Wound Offloading (Prevention Methods) Bed, pressure reduction mattress   Musculoskeletal   Musculoskeletal (WDL) WDL   Care Plan - Discharge Planning Goals   Discharge to home or other facility with appropriate resources Identify barriers to discharge with patient and caregiver;Arrange for needed discharge resources and transportation as appropriate;Identify discharge

## 2024-08-18 NOTE — PROGRESS NOTES
Pt becoming more alert this shift and answering questions. Pt oriented to self and time but did not remember why he came to the hospital and thought he was still at the nursing home.     Pt was able to ambulate to the bathroom and back to bed. He refused breakfast or anything to drink and states he would like to sleep.

## 2024-08-18 NOTE — PROCEDURES
INTERPRETATION:  This more than 2-hour, rapid 8-channel EEG recording is abnormal due to the followings.    1.  Electrographic seizure with left temporal onset.  2.  Moderate to severe continuous generalized slowing background activity.     The EEG findings were consistent with focal seizure with left temporal onset and moderate to severe nonspecific generalized cerebral dysfunction or medication effect.    CLASSIFICATION:  Dysrhythmia grade 3, left temporal onset electrographic seizure.    DESCRIPTION:  BACKGROUND:  The EEG background showed continuous generalized low amplitude intermixed 2 to 7 Hz theta/delta activity with occasional EEG attenuation.  The EEG showed fair variability and reactivity.      INTERICTAL DISCHARGES: None    ICTAL: The study captured the onset of electrographic seizure at the end of the first portion of the study.  At that time, the EEG showed rhythmic delta activity over the left temporal area at frequency between 1 to 2 Hz.  Then the pattern was spreading to the right temporal area with higher frequency.    SEIZURE BURDEN: 0%

## 2024-08-18 NOTE — PROGRESS NOTES
Pt has refused to eat or drink anything this shift and states he just wants to sleep. BG 86. Pt has voided once this shift. RN gave pt a urinal and instructed to use next time to collect UA.    Attending notified of poor intake. D5NS started per order.

## 2024-08-18 NOTE — FLOWSHEET NOTE
08/17/24 2115   Treatment Team Notification   Reason for Communication Medication concern   Name of Team Member Notified Heather SELF   Treatment Team Role Advanced Practice Nurse   Method of Communication Secure Message   Response Waiting for response   Notification Time 2115     Requested evaluation of ordered PO meds and requested any possible changes to IV d/t pt not alert.

## 2024-08-18 NOTE — FLOWSHEET NOTE
08/17/24 2300   Oxygen Therapy   SpO2 97 %   O2 Device None (Room air)   O2 Flow Rate (L/min) 0 L/min     Pt removed O2, spot check SpO2 done, remains on RA at this time.

## 2024-08-19 VITALS
TEMPERATURE: 97.9 F | RESPIRATION RATE: 12 BRPM | HEART RATE: 66 BPM | OXYGEN SATURATION: 96 % | HEIGHT: 69 IN | WEIGHT: 148.59 LBS | BODY MASS INDEX: 22.01 KG/M2 | SYSTOLIC BLOOD PRESSURE: 117 MMHG | DIASTOLIC BLOOD PRESSURE: 79 MMHG

## 2024-08-19 LAB
GLUCOSE BLD-MCNC: 114 MG/DL (ref 70–99)
GLUCOSE BLD-MCNC: 130 MG/DL (ref 70–99)
GLUCOSE BLD-MCNC: 94 MG/DL (ref 70–99)
PERFORMED ON: ABNORMAL
PERFORMED ON: ABNORMAL
PERFORMED ON: NORMAL

## 2024-08-19 PROCEDURE — 6370000000 HC RX 637 (ALT 250 FOR IP): Performed by: STUDENT IN AN ORGANIZED HEALTH CARE EDUCATION/TRAINING PROGRAM

## 2024-08-19 PROCEDURE — 6370000000 HC RX 637 (ALT 250 FOR IP): Performed by: INTERNAL MEDICINE

## 2024-08-19 PROCEDURE — 2580000003 HC RX 258: Performed by: NURSE PRACTITIONER

## 2024-08-19 PROCEDURE — 6360000002 HC RX W HCPCS: Performed by: INTERNAL MEDICINE

## 2024-08-19 PROCEDURE — 2580000003 HC RX 258: Performed by: HOSPITALIST

## 2024-08-19 PROCEDURE — 6360000002 HC RX W HCPCS: Performed by: NURSE PRACTITIONER

## 2024-08-19 PROCEDURE — 94760 N-INVAS EAR/PLS OXIMETRY 1: CPT

## 2024-08-19 PROCEDURE — 2500000003 HC RX 250 WO HCPCS: Performed by: NURSE PRACTITIONER

## 2024-08-19 PROCEDURE — 6370000000 HC RX 637 (ALT 250 FOR IP): Performed by: NURSE PRACTITIONER

## 2024-08-19 RX ORDER — DIVALPROEX SODIUM 250 MG/1
250 TABLET, EXTENDED RELEASE ORAL 2 TIMES DAILY
Status: DISCONTINUED | OUTPATIENT
Start: 2024-08-19 | End: 2024-08-19 | Stop reason: HOSPADM

## 2024-08-19 RX ORDER — GUAIFENESIN/DEXTROMETHORPHAN 100-10MG/5
5 SYRUP ORAL EVERY 4 HOURS PRN
Status: DISCONTINUED | OUTPATIENT
Start: 2024-08-19 | End: 2024-08-19 | Stop reason: HOSPADM

## 2024-08-19 RX ORDER — GUAIFENESIN/DEXTROMETHORPHAN 100-10MG/5
5 SYRUP ORAL EVERY 4 HOURS PRN
Qty: 120 ML | Refills: 0 | Status: SHIPPED | OUTPATIENT
Start: 2024-08-19 | End: 2024-08-29

## 2024-08-19 RX ORDER — BISMUTH SUBSALICYLATE 262 MG/1
524 TABLET, CHEWABLE ORAL DAILY PRN
Qty: 10 TABLET | Refills: 0 | Status: SHIPPED | OUTPATIENT
Start: 2024-08-19 | End: 2024-08-26

## 2024-08-19 RX ORDER — LEVETIRACETAM 500 MG/1
500 TABLET ORAL 2 TIMES DAILY
Status: DISCONTINUED | OUTPATIENT
Start: 2024-08-19 | End: 2024-08-19 | Stop reason: HOSPADM

## 2024-08-19 RX ORDER — METHOCARBAMOL 500 MG/1
1000 TABLET, FILM COATED ORAL 3 TIMES DAILY
Status: DISCONTINUED | OUTPATIENT
Start: 2024-08-19 | End: 2024-08-19 | Stop reason: HOSPADM

## 2024-08-19 RX ORDER — TRAMADOL HYDROCHLORIDE 50 MG/1
50 TABLET ORAL ONCE
Status: COMPLETED | OUTPATIENT
Start: 2024-08-19 | End: 2024-08-19

## 2024-08-19 RX ORDER — BISMUTH SUBSALICYLATE 262 MG/1
524 TABLET, CHEWABLE ORAL DAILY PRN
Status: DISCONTINUED | OUTPATIENT
Start: 2024-08-19 | End: 2024-08-19 | Stop reason: HOSPADM

## 2024-08-19 RX ADMIN — BISMUTH SUBSALICYLATE 524 MG: 262 TABLET, CHEWABLE ORAL at 14:05

## 2024-08-19 RX ADMIN — METHOCARBAMOL 1000 MG: 500 TABLET ORAL at 13:36

## 2024-08-19 RX ADMIN — METHOCARBAMOL 1000 MG: 500 TABLET ORAL at 07:55

## 2024-08-19 RX ADMIN — DEXTROSE AND SODIUM CHLORIDE: 5; 900 INJECTION, SOLUTION INTRAVENOUS at 07:54

## 2024-08-19 RX ADMIN — LEVETIRACETAM 500 MG: 100 INJECTION INTRAVENOUS at 00:16

## 2024-08-19 RX ADMIN — ACETAMINOPHEN 650 MG: 325 TABLET ORAL at 07:00

## 2024-08-19 RX ADMIN — METHOCARBAMOL 1000 MG: 500 TABLET ORAL at 01:20

## 2024-08-19 RX ADMIN — TRAMADOL HYDROCHLORIDE 50 MG: 50 TABLET ORAL at 01:20

## 2024-08-19 RX ADMIN — LEVETIRACETAM 500 MG: 500 TABLET, FILM COATED ORAL at 13:36

## 2024-08-19 RX ADMIN — DIVALPROEX SODIUM 250 MG: 250 TABLET, EXTENDED RELEASE ORAL at 14:04

## 2024-08-19 RX ADMIN — VALPROATE SODIUM 250 MG: 100 INJECTION, SOLUTION INTRAVENOUS at 00:27

## 2024-08-19 RX ADMIN — ENOXAPARIN SODIUM 40 MG: 100 INJECTION SUBCUTANEOUS at 07:56

## 2024-08-19 ASSESSMENT — PAIN DESCRIPTION - DESCRIPTORS
DESCRIPTORS: ACHING

## 2024-08-19 ASSESSMENT — PAIN SCALES - GENERAL
PAINLEVEL_OUTOF10: 8
PAINLEVEL_OUTOF10: 9
PAINLEVEL_OUTOF10: 9

## 2024-08-19 ASSESSMENT — PAIN - FUNCTIONAL ASSESSMENT
PAIN_FUNCTIONAL_ASSESSMENT: ACTIVITIES ARE NOT PREVENTED
PAIN_FUNCTIONAL_ASSESSMENT: ACTIVITIES ARE NOT PREVENTED

## 2024-08-19 ASSESSMENT — PAIN DESCRIPTION - LOCATION
LOCATION: OTHER (COMMENT)

## 2024-08-19 ASSESSMENT — PAIN DESCRIPTION - ORIENTATION: ORIENTATION: RIGHT;LEFT;LOWER;MID

## 2024-08-19 NOTE — DISCHARGE INSTR - COC
information and transfer of Francisco Espnioza  is necessary for the continuing treatment of the diagnosis listed and that he requires intermediate nursing care for greater 30 days.     Update Admission H&P: No change in H&P    PHYSICIAN SIGNATURE:  Electronically signed by William Adam MD on 8/19/24 at 9:19 AM EDT

## 2024-08-19 NOTE — CARE COORDINATION
Case Management Discharge Note          Date / Time of Note: 8/19/2024 12:15 PM                  Patient Name: Francisco Espinoza   YOB: 1970  Diagnosis: Seizure (HCC) [R56.9]   Date / Time: 8/17/2024  1:48 PM    Financial:  Payor: MEDICAID [place] / Plan: MEDICAID [facility] DEPT OF JOB / Product Type: *No Product type* /      Pharmacy:  No Pharmacies Listed    Assistance purchasing medications?: Potential Assistance Purchasing Medications: No  Assistance provided by Case Management: None at this time    DISCHARGE Disposition: Long Term Care Facility (LTC)    Nursing Facility:   Discharging to Facility/ Agency   Name: [facility]  Address:  [address] [place] [address], [place] [address]   Phone:  [phone]  Fax:  [phone]      LOC at discharge: Long Term Care  KATIE Completed: Yes             NURSING REPORT NUMBER: [phone] option #1               NURSING FAX NUMBER: [phone]    Notification completed in HENS/PAS?:  Not Applicable    Referrals made at DISCHARGE for outpatient continued care:  Not Applicable    Transportation:  Transportation PLAN for discharge: EMS transportation   Mode of Transport: Ambulance stretcher - BLS  Reason for medical transport: Confused due to Epilepsy with altered consciousness and requires ambulance transport due to safety and fall risk   Name of Transport Company: [email or web address]  Phone: [phone]  Time of Transport: 1400    Transport form completed: Yes    IMM Completed:   Not Indicated    Additional CM Notes: Discharge order noted. Patient return to [facility] LTC. Transport arranged. BE     The Plan for Transition of Care is related to the following treatment goals of Seizure (HCC) [R56.9]    The Patient and/or patient representative Francisco and his family were provided with a choice of provider and agrees with the discharge plan Yes    Freedom of choice list was provided with basic dialogue that supports the patient's individualized plan of

## 2024-08-19 NOTE — PROGRESS NOTES
This RN went to give pt IV push keppra and IVPB depacon and pt IV had infiltrated. Pt is scheduled to leave at 1400 back to [facility]. This RN reached out Dr. Adam at 1233 in regards to wanting new IV placement or if meds could be switched to oral. While waiting for response this RN found Owen Munoz NP and was able to get Keppra switched to oral. Electronically signed by Rob Rizzo RN on 8/19/24 at 12:39 PM EDT

## 2024-08-19 NOTE — PLAN OF CARE
Problem: Discharge Planning  Goal: Discharge to home or other facility with appropriate resources  8/19/2024 0306 by Shawnee Barlow RN  Outcome: Progressing     Problem: Pain  Goal: Verbalizes/displays adequate comfort level or baseline comfort level  8/19/2024 0306 by Shawnee Barlow RN  Outcome: Progressing     Problem: ABCDS Injury Assessment  Goal: Absence of physical injury  8/19/2024 0306 by Shawnee Barlow RN  Outcome: Progressing     Problem: Safety - Adult  Goal: Free from fall injury  8/19/2024 0306 by Shawnee Barlow RN  Outcome: Progressing

## 2024-08-19 NOTE — DISCHARGE SUMMARY
no SOB. Also some diarrhea but under control.       Physical Exam  Vitals:   Vitals:    08/19/24 0749   BP: 117/79   Pulse: 66   Resp: 12   Temp: 97.9 °F (36.6 °C)   SpO2: 96%       General: NAD  Eyes: EOMI  ENT: neck supple  Cardiovascular: Regular rate.  Respiratory: Clear to auscultation  Gastrointestinal: Soft, non tender  Genitourinary: no suprapubic tenderness  Musculoskeletal: No edema  Skin: warm, dry  Neuro: Alert.  Psych: Mood appropriate.     The patient expressed appropriate understanding of and agreement with the discharge recommendations, medications, and plan.     Consults this admission:  IP CONSULT TO NEUROLOGY      Discharge Instruction:   Handoff to PCP:     Follow up appointments: neuro  Primary care physician: No primary care provider on file.      Diet:  regular diet   Activity: activity as tolerated  Disposition: Discharged to:    []Home, []C, [x]SNF, []Acute Rehab, []Hospice   Condition on discharge: Stable    Discharge Medications:        Medication List        START taking these medications      bismuth subsalicylate 262 MG chewable tablet  Commonly known as: PEPTO BISMOL  Take 2 tablets by mouth daily as needed for Heartburn or Diarrhea     guaiFENesin-dextromethorphan 100-10 MG/5ML syrup  Commonly known as: ROBITUSSIN DM  Take 5 mLs by mouth every 4 hours as needed for Cough            CONTINUE taking these medications      divalproex 250 MG extended release tablet  Commonly known as: DEPAKOTE ER     ergocalciferol 1.25 MG (13767 UT) capsule  Commonly known as: ERGOCALCIFEROL     levETIRAcetam 500 MG tablet  Commonly known as: KEPPRA     * LORazepam 0.5 MG tablet  Commonly known as: ATIVAN     * LORazepam 2 MG/ML injection  Commonly known as: ATIVAN     traZODone 50 MG tablet  Commonly known as: DESYREL     zonisamide 100 MG capsule  Commonly known as: ZONEGRAN           * This list has 2 medication(s) that are the same as other medications prescribed for you. Read the directions

## 2024-08-19 NOTE — PROGRESS NOTES
Pt discharged via CMT at 1410. IV removed without complications. Tele removed and [facility] called. This RN tried calling report at 1338 and 1408 and both times this RN was sent to voicemail; HIPAA approved message and call back number left on voicemail. Pt left with belongings. No complications. Electronically signed by Rob Rizzo RN on 8/19/24 at 2:12 PM EDT

## 2024-08-19 NOTE — PROGRESS NOTES
CLINICAL PHARMACY NOTE: MEDS TO BEDS    Retail pharmacy has been notified that the patient is either going to a SNF, ARU, or other inpatient facility.     All prescriptions sent to the retail pharmacy for this patient will be kept on profile unless told otherwise.    08/19/24 9:34 AM

## 2024-08-19 NOTE — PLAN OF CARE
Problem: Discharge Planning  Goal: Discharge to home or other facility with appropriate resources  8/19/2024 0824 by Rob Rizzo, RN  Outcome: Progressing     Problem: Pain  Goal: Verbalizes/displays adequate comfort level or baseline comfort level  8/19/2024 0824 by Rob Rizzo, RN  Outcome: Progressing     Problem: ABCDS Injury Assessment  Goal: Absence of physical injury  8/19/2024 0824 by Rob Rizzo, RN  Outcome: Progressing     Problem: Safety - Adult  Goal: Free from fall injury  8/19/2024 0824 by Rob Rizzo, RN  Outcome: Progressing

## 2024-08-19 NOTE — CARE COORDINATION
08/19/24 1154   Service Assessment   Patient Orientation Person;Place;Situation   Cognition Short Term Memory Deficit   History Provided By Child/Family  (Called mother Alberta)   Primary Caregiver Other (Comment)  (from [facility])   Accompanied By/Relationship n/a   Support Systems Family Members;Other (Comment)  (from [facility])   Patient's Healthcare Decision Maker is: Legal Next of Kin   PCP Verified by CM Yes   Last Visit to PCP Within last 3 months  (Patient from [facility])   Prior Functional Level Bathing;Dressing;Toileting;Assistance with the following:;Cooking;Housework;Shopping;Mobility  (Patient from [facility])   Current Functional Level Mobility;Shopping;Housework;Toileting;Dressing;Bathing;Assistance with the following:;Cooking  (Patient from [facility])   Can patient return to prior living arrangement Yes   Ability to make needs known: Poor  (Reached out to rc Ruiz)   Family able to assist with home care needs: Other (comment)  (Patient from [facility])   Would you like for me to discuss the discharge plan with any other family members/significant others, and if so, who? Yes  (Mother- Alberta)   Financial Resources Medicaid   Community Resources ECF/Home Care   CM/SW Referral Other (see comment)  (discharge planning)   Discharge Planning   Type of Residence Long-Term Care   Living Arrangements Other (Comment)  (Patient from [facility])   Current Services Prior To Admission Extended Care Facility   Potential Assistance Needed Extended Care Facility   DME Ordered? No   Potential Assistance Purchasing Medications No   Type of Home Care Services None   Patient expects to be discharged to: Long-term care   History of falls? 0   Services At/After Discharge   Transition of Care Consult (CM Consult) N/A   Services At/After Discharge Long term care   [place] Resource Information Provided? No   Mode of Transport at Discharge BLS

## 2024-08-19 NOTE — H&P
mouth in the morning, at noon, and at bedtime. Max Daily Amount: 1.5 mg   Yes Wily Kirby MD   LORazepam (ATIVAN) 2 MG/ML injection Inject 0.5 mLs into the muscle every 15 minutes as needed (witnessed seizures). Call if IM does not work Max Daily Amount: 96 mg   Yes Wily Kirby MD   divalproex (DEPAKOTE ER) 250 MG extended release tablet Take 1 tablet by mouth in the morning and at bedtime Give 1 tab by mouth two times a day for mood disorder   Yes Wily Kirby MD   levETIRAcetam (KEPPRA) 500 MG tablet Take 1 tablet by mouth 2 times daily   Yes Wily Kirby MD   traZODone (DESYREL) 50 MG tablet Take 1 tablet by mouth nightly   Yes Wily Kirby MD   zonisamide (ZONEGRAN) 100 MG capsule Take 2 capsules by mouth nightly Give 2 capsules by mouth at bedtime for seizures   Yes Wily Kirby MD   ergocalciferol (ERGOCALCIFEROL) 1.25 MG (48315 UT) capsule Take 1 capsule by mouth once a week Every friday    Wily Kirby MD       Allergies:  Patient has no known allergies.    Social History:      The patient currently lives at a facility    TOBACCO:   has no history on file for tobacco use.  ETOH:   reports current alcohol use.  E-cigarette/Vaping       Questions Responses    E-cigarette/Vaping Use     Start Date     Passive Exposure     Quit Date     Counseling Given     Comments               Family History:      Reviewed and negative in regards to presenting illness/complaint.    History reviewed. No pertinent family history.    REVIEW OF SYSTEMS COMPLETED:   Pertinent positives as noted in the HPI. All other systems reviewed and negative.    PHYSICAL EXAM PERFORMED:    /79   Pulse 66   Temp 97.9 °F (36.6 °C) (Oral)   Resp 12   Ht 1.753 m (5' 9\")   Wt 67.4 kg (148 lb 9.4 oz)   SpO2 96%   BMI 21.94 kg/m²     General appearance:  No apparent distress  HEENT:  Normal cephalic  Respiratory:  Normal respiratory effort. Clear to auscultation,

## 2024-08-19 NOTE — CARE COORDINATION
NAOMY HENAO spoke with Samantha at [phone] from [facility] and patient from long term care. Patient is able to return. No pre-cert needed.   Electronically signed by Marlin Lemus RN on 8/19/2024 at 12:08 PM  [phone]

## 2024-08-20 LAB — ZONISAMIDE SERPL-MCNC: 5 UG/ML (ref 10–40)

## 2025-02-19 ENCOUNTER — HOSPITAL ENCOUNTER (INPATIENT)
Age: 55
LOS: 5 days | Discharge: NURSING, REHAB OR LONG-TERM CARE | DRG: 426 | End: 2025-02-24
Attending: EMERGENCY MEDICINE | Admitting: INTERNAL MEDICINE
Payer: COMMERCIAL

## 2025-02-19 ENCOUNTER — APPOINTMENT (OUTPATIENT)
Dept: CT IMAGING | Age: 55
DRG: 426 | End: 2025-02-19
Payer: COMMERCIAL

## 2025-02-19 ENCOUNTER — APPOINTMENT (OUTPATIENT)
Dept: GENERAL RADIOLOGY | Age: 55
DRG: 426 | End: 2025-02-19
Payer: COMMERCIAL

## 2025-02-19 DIAGNOSIS — R56.9 SEIZURES (HCC): Primary | ICD-10-CM

## 2025-02-19 DIAGNOSIS — E87.1 HYPONATREMIA: ICD-10-CM

## 2025-02-19 DIAGNOSIS — R56.9 POST-ICTAL STATE (HCC): ICD-10-CM

## 2025-02-19 LAB
ALBUMIN SERPL-MCNC: 3.7 G/DL (ref 3.4–5)
AMPHETAMINES UR QL SCN>1000 NG/ML: NORMAL
ANION GAP SERPL CALCULATED.3IONS-SCNC: 10 MMOL/L (ref 3–16)
ANION GAP SERPL CALCULATED.3IONS-SCNC: 10 MMOL/L (ref 3–16)
ANION GAP SERPL CALCULATED.3IONS-SCNC: 13 MMOL/L (ref 3–16)
ANION GAP SERPL CALCULATED.3IONS-SCNC: 9 MMOL/L (ref 3–16)
BARBITURATES UR QL SCN>200 NG/ML: NORMAL
BASOPHILS # BLD: 0 K/UL (ref 0–0.2)
BASOPHILS NFR BLD: 0.5 %
BENZODIAZ UR QL SCN>200 NG/ML: NORMAL
BILIRUB UR QL STRIP.AUTO: NEGATIVE
BUN SERPL-MCNC: 10 MG/DL (ref 7–20)
BUN SERPL-MCNC: 8 MG/DL (ref 7–20)
BUN SERPL-MCNC: 9 MG/DL (ref 7–20)
CALCIUM SERPL-MCNC: 8.5 MG/DL (ref 8.3–10.6)
CALCIUM SERPL-MCNC: 8.8 MG/DL (ref 8.3–10.6)
CALCIUM SERPL-MCNC: 8.9 MG/DL (ref 8.3–10.6)
CALCIUM SERPL-MCNC: 9.1 MG/DL (ref 8.3–10.6)
CALCIUM SERPL-MCNC: 9.4 MG/DL (ref 8.3–10.6)
CALCIUM SERPL-MCNC: 9.5 MG/DL (ref 8.3–10.6)
CANNABINOIDS UR QL SCN>50 NG/ML: NORMAL
CHLORIDE SERPL-SCNC: 101 MMOL/L (ref 99–110)
CHLORIDE SERPL-SCNC: 101 MMOL/L (ref 99–110)
CHLORIDE SERPL-SCNC: 102 MMOL/L (ref 99–110)
CHLORIDE SERPL-SCNC: 103 MMOL/L (ref 99–110)
CHLORIDE SERPL-SCNC: 103 MMOL/L (ref 99–110)
CHLORIDE SERPL-SCNC: 88 MMOL/L (ref 99–110)
CLARITY UR: CLEAR
CO2 SERPL-SCNC: 20 MMOL/L (ref 21–32)
CO2 SERPL-SCNC: 21 MMOL/L (ref 21–32)
CO2 SERPL-SCNC: 22 MMOL/L (ref 21–32)
CO2 SERPL-SCNC: 22 MMOL/L (ref 21–32)
CO2 SERPL-SCNC: 23 MMOL/L (ref 21–32)
CO2 SERPL-SCNC: 23 MMOL/L (ref 21–32)
COCAINE UR QL SCN: NORMAL
COLOR UR: YELLOW
CREAT SERPL-MCNC: 0.8 MG/DL (ref 0.9–1.3)
CREAT SERPL-MCNC: 0.9 MG/DL (ref 0.9–1.3)
DEPRECATED RDW RBC AUTO: 13.1 % (ref 12.4–15.4)
DRUG SCREEN COMMENT UR-IMP: NORMAL
EKG ATRIAL RATE: 89 BPM
EKG DIAGNOSIS: NORMAL
EKG P AXIS: 48 DEGREES
EKG P-R INTERVAL: 140 MS
EKG Q-T INTERVAL: 360 MS
EKG QRS DURATION: 88 MS
EKG QTC CALCULATION (BAZETT): 438 MS
EKG R AXIS: -35 DEGREES
EKG T AXIS: -14 DEGREES
EKG VENTRICULAR RATE: 89 BPM
EOSINOPHIL # BLD: 0 K/UL (ref 0–0.6)
EOSINOPHIL NFR BLD: 0.2 %
FENTANYL SCREEN, URINE: NORMAL
GFR SERPLBLD CREATININE-BSD FMLA CKD-EPI: >90 ML/MIN/{1.73_M2}
GLUCOSE SERPL-MCNC: 103 MG/DL (ref 70–99)
GLUCOSE SERPL-MCNC: 105 MG/DL (ref 70–99)
GLUCOSE SERPL-MCNC: 111 MG/DL (ref 70–99)
GLUCOSE SERPL-MCNC: 119 MG/DL (ref 70–99)
GLUCOSE SERPL-MCNC: 129 MG/DL (ref 70–99)
GLUCOSE SERPL-MCNC: 142 MG/DL (ref 70–99)
GLUCOSE UR STRIP.AUTO-MCNC: NEGATIVE MG/DL
HCT VFR BLD AUTO: 34.3 % (ref 40.5–52.5)
HGB BLD-MCNC: 11.4 G/DL (ref 13.5–17.5)
HGB UR QL STRIP.AUTO: NEGATIVE
KETONES UR STRIP.AUTO-MCNC: NEGATIVE MG/DL
LEUKOCYTE ESTERASE UR QL STRIP.AUTO: NEGATIVE
LYMPHOCYTES # BLD: 0.5 K/UL (ref 1–5.1)
LYMPHOCYTES NFR BLD: 9.3 %
MCH RBC QN AUTO: 30 PG (ref 26–34)
MCHC RBC AUTO-ENTMCNC: 33.1 G/DL (ref 31–36)
MCV RBC AUTO: 90.5 FL (ref 80–100)
METHADONE UR QL SCN>300 NG/ML: NORMAL
MONOCYTES # BLD: 0.6 K/UL (ref 0–1.3)
MONOCYTES NFR BLD: 10.8 %
NEUTROPHILS # BLD: 4.1 K/UL (ref 1.7–7.7)
NEUTROPHILS NFR BLD: 79.2 %
NITRITE UR QL STRIP.AUTO: NEGATIVE
OPIATES UR QL SCN>300 NG/ML: NORMAL
OSMOLALITY UR: 117 MOSM/KG (ref 390–1070)
OXYCODONE UR QL SCN: NORMAL
PCP UR QL SCN>25 NG/ML: NORMAL
PH UR STRIP.AUTO: 7.5 [PH] (ref 5–8)
PH UR STRIP: 6.5 [PH]
PLATELET # BLD AUTO: 207 K/UL (ref 135–450)
PMV BLD AUTO: 7.7 FL (ref 5–10.5)
POTASSIUM SERPL-SCNC: 4 MMOL/L (ref 3.5–5.1)
POTASSIUM SERPL-SCNC: 4.1 MMOL/L (ref 3.5–5.1)
POTASSIUM SERPL-SCNC: 4.2 MMOL/L (ref 3.5–5.1)
POTASSIUM SERPL-SCNC: 4.5 MMOL/L (ref 3.5–5.1)
PROT UR STRIP.AUTO-MCNC: NEGATIVE MG/DL
RBC # BLD AUTO: 3.79 M/UL (ref 4.2–5.9)
SODIUM SERPL-SCNC: 121 MMOL/L (ref 136–145)
SODIUM SERPL-SCNC: 132 MMOL/L (ref 136–145)
SODIUM SERPL-SCNC: 133 MMOL/L (ref 136–145)
SODIUM SERPL-SCNC: 134 MMOL/L (ref 136–145)
SODIUM SERPL-SCNC: 134 MMOL/L (ref 136–145)
SODIUM SERPL-SCNC: 135 MMOL/L (ref 136–145)
SODIUM UR-SCNC: <20 MMOL/L
SP GR UR STRIP.AUTO: 1 (ref 1–1.03)
TROPONIN, HIGH SENSITIVITY: 7 NG/L (ref 0–22)
TROPONIN, HIGH SENSITIVITY: 8 NG/L (ref 0–22)
UA COMPLETE W REFLEX CULTURE PNL UR: NORMAL
UA DIPSTICK W REFLEX MICRO PNL UR: NORMAL
URN SPEC COLLECT METH UR: NORMAL
UROBILINOGEN UR STRIP-ACNC: 0.2 E.U./DL
VALPROATE SERPL-MCNC: 3 UG/ML (ref 50–100)
WBC # BLD AUTO: 5.2 K/UL (ref 4–11)

## 2025-02-19 PROCEDURE — 6360000002 HC RX W HCPCS

## 2025-02-19 PROCEDURE — 6360000002 HC RX W HCPCS: Performed by: NURSE PRACTITIONER

## 2025-02-19 PROCEDURE — 2580000003 HC RX 258

## 2025-02-19 PROCEDURE — 84300 ASSAY OF URINE SODIUM: CPT

## 2025-02-19 PROCEDURE — 71250 CT THORAX DX C-: CPT

## 2025-02-19 PROCEDURE — 2580000003 HC RX 258: Performed by: NURSE PRACTITIONER

## 2025-02-19 PROCEDURE — 6360000002 HC RX W HCPCS: Performed by: INTERNAL MEDICINE

## 2025-02-19 PROCEDURE — 73502 X-RAY EXAM HIP UNI 2-3 VIEWS: CPT

## 2025-02-19 PROCEDURE — 99285 EMERGENCY DEPT VISIT HI MDM: CPT

## 2025-02-19 PROCEDURE — 80164 ASSAY DIPROPYLACETIC ACD TOT: CPT

## 2025-02-19 PROCEDURE — 71045 X-RAY EXAM CHEST 1 VIEW: CPT

## 2025-02-19 PROCEDURE — 83935 ASSAY OF URINE OSMOLALITY: CPT

## 2025-02-19 PROCEDURE — 93010 ELECTROCARDIOGRAM REPORT: CPT | Performed by: INTERNAL MEDICINE

## 2025-02-19 PROCEDURE — 96361 HYDRATE IV INFUSION ADD-ON: CPT

## 2025-02-19 PROCEDURE — 72125 CT NECK SPINE W/O DYE: CPT

## 2025-02-19 PROCEDURE — 80235 DRUG ASSAY LACOSAMIDE: CPT

## 2025-02-19 PROCEDURE — 93005 ELECTROCARDIOGRAM TRACING: CPT | Performed by: EMERGENCY MEDICINE

## 2025-02-19 PROCEDURE — 6370000000 HC RX 637 (ALT 250 FOR IP): Performed by: INTERNAL MEDICINE

## 2025-02-19 PROCEDURE — 94760 N-INVAS EAR/PLS OXIMETRY 1: CPT

## 2025-02-19 PROCEDURE — 80307 DRUG TEST PRSMV CHEM ANLYZR: CPT

## 2025-02-19 PROCEDURE — 85025 COMPLETE CBC W/AUTO DIFF WBC: CPT

## 2025-02-19 PROCEDURE — 6370000000 HC RX 637 (ALT 250 FOR IP): Performed by: NURSE PRACTITIONER

## 2025-02-19 PROCEDURE — 36415 COLL VENOUS BLD VENIPUNCTURE: CPT

## 2025-02-19 PROCEDURE — 96375 TX/PRO/DX INJ NEW DRUG ADDON: CPT

## 2025-02-19 PROCEDURE — 82040 ASSAY OF SERUM ALBUMIN: CPT

## 2025-02-19 PROCEDURE — 96374 THER/PROPH/DIAG INJ IV PUSH: CPT

## 2025-02-19 PROCEDURE — 84484 ASSAY OF TROPONIN QUANT: CPT

## 2025-02-19 PROCEDURE — 81003 URINALYSIS AUTO W/O SCOPE: CPT

## 2025-02-19 PROCEDURE — 2500000003 HC RX 250 WO HCPCS: Performed by: INTERNAL MEDICINE

## 2025-02-19 PROCEDURE — 70450 CT HEAD/BRAIN W/O DYE: CPT

## 2025-02-19 PROCEDURE — 80203 DRUG SCREEN QUANT ZONISAMIDE: CPT

## 2025-02-19 PROCEDURE — 2000000000 HC ICU R&B

## 2025-02-19 PROCEDURE — 80048 BASIC METABOLIC PNL TOTAL CA: CPT

## 2025-02-19 RX ORDER — POLYETHYLENE GLYCOL 3350 17 G/17G
17 POWDER, FOR SOLUTION ORAL DAILY PRN
Status: DISCONTINUED | OUTPATIENT
Start: 2025-02-19 | End: 2025-02-24 | Stop reason: HOSPADM

## 2025-02-19 RX ORDER — METHOCARBAMOL 500 MG/1
500 TABLET, FILM COATED ORAL 3 TIMES DAILY PRN
Status: DISCONTINUED | OUTPATIENT
Start: 2025-02-19 | End: 2025-02-24 | Stop reason: HOSPADM

## 2025-02-19 RX ORDER — LORAZEPAM 0.5 MG/1
0.5 TABLET ORAL 3 TIMES DAILY
Status: CANCELLED | OUTPATIENT
Start: 2025-02-19

## 2025-02-19 RX ORDER — NICOTINE 21 MG/24HR
1 PATCH, TRANSDERMAL 24 HOURS TRANSDERMAL DAILY
COMMUNITY
Start: 2025-02-15

## 2025-02-19 RX ORDER — ENOXAPARIN SODIUM 100 MG/ML
40 INJECTION SUBCUTANEOUS DAILY
Status: DISCONTINUED | OUTPATIENT
Start: 2025-02-19 | End: 2025-02-24 | Stop reason: HOSPADM

## 2025-02-19 RX ORDER — LIDOCAINE 50 MG/G
1 PATCH TOPICAL EVERY 24 HOURS
COMMUNITY
Start: 2025-01-18

## 2025-02-19 RX ORDER — 3% SODIUM CHLORIDE 3 G/100ML
600 INJECTION, SOLUTION INTRAVENOUS ONCE
Status: COMPLETED | OUTPATIENT
Start: 2025-02-19 | End: 2025-02-19

## 2025-02-19 RX ORDER — FAMOTIDINE 20 MG/1
20 TABLET, FILM COATED ORAL 2 TIMES DAILY
Status: DISCONTINUED | OUTPATIENT
Start: 2025-02-19 | End: 2025-02-24 | Stop reason: HOSPADM

## 2025-02-19 RX ORDER — POTASSIUM CHLORIDE 7.45 MG/ML
10 INJECTION INTRAVENOUS PRN
Status: DISCONTINUED | OUTPATIENT
Start: 2025-02-19 | End: 2025-02-24 | Stop reason: HOSPADM

## 2025-02-19 RX ORDER — LEVETIRACETAM 500 MG/5ML
1000 INJECTION, SOLUTION, CONCENTRATE INTRAVENOUS ONCE
Status: COMPLETED | OUTPATIENT
Start: 2025-02-19 | End: 2025-02-19

## 2025-02-19 RX ORDER — FAMOTIDINE 20 MG/1
20 TABLET, FILM COATED ORAL 2 TIMES DAILY
COMMUNITY
Start: 2025-01-18

## 2025-02-19 RX ORDER — ASCORBIC ACID 100 MG
1 TABLET,CHEWABLE ORAL DAILY
COMMUNITY
Start: 2025-02-15 | End: 2025-03-17

## 2025-02-19 RX ORDER — ZONISAMIDE 100 MG/1
400 CAPSULE ORAL NIGHTLY
Status: DISCONTINUED | OUTPATIENT
Start: 2025-02-19 | End: 2025-02-24 | Stop reason: HOSPADM

## 2025-02-19 RX ORDER — POTASSIUM CHLORIDE 1500 MG/1
40 TABLET, EXTENDED RELEASE ORAL PRN
Status: DISCONTINUED | OUTPATIENT
Start: 2025-02-19 | End: 2025-02-24 | Stop reason: HOSPADM

## 2025-02-19 RX ORDER — 3% SODIUM CHLORIDE 3 G/100ML
50 INJECTION, SOLUTION INTRAVENOUS ONCE
Status: DISCONTINUED | OUTPATIENT
Start: 2025-02-19 | End: 2025-02-19

## 2025-02-19 RX ORDER — SODIUM CHLORIDE 0.9 % (FLUSH) 0.9 %
5-40 SYRINGE (ML) INJECTION PRN
Status: DISCONTINUED | OUTPATIENT
Start: 2025-02-19 | End: 2025-02-24 | Stop reason: HOSPADM

## 2025-02-19 RX ORDER — TRAZODONE HYDROCHLORIDE 50 MG/1
50 TABLET ORAL NIGHTLY
Status: DISCONTINUED | OUTPATIENT
Start: 2025-02-19 | End: 2025-02-24 | Stop reason: HOSPADM

## 2025-02-19 RX ORDER — ACETAMINOPHEN 500 MG
500 TABLET ORAL EVERY 6 HOURS PRN
COMMUNITY
Start: 2025-01-18

## 2025-02-19 RX ORDER — NALTREXONE HYDROCHLORIDE 50 MG/1
50 TABLET, FILM COATED ORAL DAILY PRN
COMMUNITY
Start: 2025-02-15

## 2025-02-19 RX ORDER — LORAZEPAM 2 MG/ML
2 INJECTION INTRAMUSCULAR ONCE
Status: COMPLETED | OUTPATIENT
Start: 2025-02-19 | End: 2025-02-19

## 2025-02-19 RX ORDER — CELECOXIB 100 MG/1
100 CAPSULE ORAL 2 TIMES DAILY PRN
COMMUNITY
Start: 2025-02-15

## 2025-02-19 RX ORDER — ONDANSETRON 2 MG/ML
4 INJECTION INTRAMUSCULAR; INTRAVENOUS EVERY 6 HOURS PRN
Status: DISCONTINUED | OUTPATIENT
Start: 2025-02-19 | End: 2025-02-24 | Stop reason: HOSPADM

## 2025-02-19 RX ORDER — ERGOCALCIFEROL 1.25 MG/1
50000 CAPSULE, LIQUID FILLED ORAL WEEKLY
Status: DISCONTINUED | OUTPATIENT
Start: 2025-02-21 | End: 2025-02-24 | Stop reason: HOSPADM

## 2025-02-19 RX ORDER — ACETAMINOPHEN 325 MG/1
650 TABLET ORAL EVERY 6 HOURS PRN
Status: DISCONTINUED | OUTPATIENT
Start: 2025-02-19 | End: 2025-02-24 | Stop reason: HOSPADM

## 2025-02-19 RX ORDER — ONDANSETRON 4 MG/1
4 TABLET, ORALLY DISINTEGRATING ORAL EVERY 8 HOURS PRN
Status: DISCONTINUED | OUTPATIENT
Start: 2025-02-19 | End: 2025-02-24 | Stop reason: HOSPADM

## 2025-02-19 RX ORDER — LEVETIRACETAM 500 MG/1
500 TABLET ORAL 2 TIMES DAILY
Status: CANCELLED | OUTPATIENT
Start: 2025-02-19

## 2025-02-19 RX ORDER — LORAZEPAM 2 MG/ML
2 INJECTION INTRAMUSCULAR EVERY 6 HOURS PRN
Status: DISCONTINUED | OUTPATIENT
Start: 2025-02-19 | End: 2025-02-24 | Stop reason: HOSPADM

## 2025-02-19 RX ORDER — ACETAMINOPHEN 650 MG/1
650 SUPPOSITORY RECTAL EVERY 6 HOURS PRN
Status: DISCONTINUED | OUTPATIENT
Start: 2025-02-19 | End: 2025-02-24 | Stop reason: HOSPADM

## 2025-02-19 RX ORDER — DEXTROSE MONOHYDRATE 50 MG/ML
INJECTION, SOLUTION INTRAVENOUS CONTINUOUS
Status: DISCONTINUED | OUTPATIENT
Start: 2025-02-19 | End: 2025-02-20

## 2025-02-19 RX ORDER — METHOCARBAMOL 500 MG/1
500 TABLET, FILM COATED ORAL 3 TIMES DAILY PRN
COMMUNITY
Start: 2025-01-18

## 2025-02-19 RX ORDER — SODIUM CHLORIDE 0.9 % (FLUSH) 0.9 %
5-40 SYRINGE (ML) INJECTION EVERY 12 HOURS SCHEDULED
Status: DISCONTINUED | OUTPATIENT
Start: 2025-02-19 | End: 2025-02-24 | Stop reason: HOSPADM

## 2025-02-19 RX ORDER — SODIUM CHLORIDE 9 MG/ML
INJECTION, SOLUTION INTRAVENOUS PRN
Status: DISCONTINUED | OUTPATIENT
Start: 2025-02-19 | End: 2025-02-24 | Stop reason: HOSPADM

## 2025-02-19 RX ORDER — DIVALPROEX SODIUM 250 MG/1
250 TABLET, FILM COATED, EXTENDED RELEASE ORAL 2 TIMES DAILY
Status: CANCELLED | OUTPATIENT
Start: 2025-02-19

## 2025-02-19 RX ORDER — LACOSAMIDE 100 MG/1
100 TABLET ORAL 2 TIMES DAILY
COMMUNITY
Start: 2025-01-27 | End: 2025-08-25

## 2025-02-19 RX ORDER — LACOSAMIDE 100 MG/1
100 TABLET ORAL 2 TIMES DAILY
Status: DISCONTINUED | OUTPATIENT
Start: 2025-02-19 | End: 2025-02-24 | Stop reason: HOSPADM

## 2025-02-19 RX ORDER — MAGNESIUM SULFATE IN WATER 40 MG/ML
2000 INJECTION, SOLUTION INTRAVENOUS PRN
Status: DISCONTINUED | OUTPATIENT
Start: 2025-02-19 | End: 2025-02-24 | Stop reason: HOSPADM

## 2025-02-19 RX ORDER — LORAZEPAM 2 MG/ML
INJECTION INTRAMUSCULAR
Status: COMPLETED
Start: 2025-02-19 | End: 2025-02-19

## 2025-02-19 RX ORDER — LOPERAMIDE HYDROCHLORIDE 2 MG/1
2 CAPSULE ORAL 2 TIMES DAILY PRN
COMMUNITY
Start: 2025-01-10

## 2025-02-19 RX ORDER — HYDROCODONE BITARTRATE AND ACETAMINOPHEN 5; 325 MG/1; MG/1
1 TABLET ORAL ONCE
Status: COMPLETED | OUTPATIENT
Start: 2025-02-19 | End: 2025-02-19

## 2025-02-19 RX ORDER — LACOSAMIDE 100 MG/1
100 TABLET ORAL ONCE
Status: DISCONTINUED | OUTPATIENT
Start: 2025-02-19 | End: 2025-02-19

## 2025-02-19 RX ORDER — 0.9 % SODIUM CHLORIDE 0.9 %
1000 INTRAVENOUS SOLUTION INTRAVENOUS ONCE
Status: DISCONTINUED | OUTPATIENT
Start: 2025-02-19 | End: 2025-02-19

## 2025-02-19 RX ORDER — ONDANSETRON 8 MG/1
8 TABLET, ORALLY DISINTEGRATING ORAL EVERY 8 HOURS PRN
COMMUNITY
Start: 2025-01-23

## 2025-02-19 RX ADMIN — TRAZODONE HYDROCHLORIDE 50 MG: 50 TABLET ORAL at 20:41

## 2025-02-19 RX ADMIN — ENOXAPARIN SODIUM 40 MG: 100 INJECTION SUBCUTANEOUS at 18:20

## 2025-02-19 RX ADMIN — HYDROCODONE BITARTRATE AND ACETAMINOPHEN 1 TABLET: 5; 325 TABLET ORAL at 09:34

## 2025-02-19 RX ADMIN — LACOSAMIDE 100 MG: 100 TABLET, FILM COATED ORAL at 20:41

## 2025-02-19 RX ADMIN — LORAZEPAM 2 MG: 2 INJECTION INTRAMUSCULAR; INTRAVENOUS at 10:15

## 2025-02-19 RX ADMIN — ACETAMINOPHEN 650 MG: 325 TABLET ORAL at 18:20

## 2025-02-19 RX ADMIN — LORAZEPAM 2 MG: 2 INJECTION INTRAMUSCULAR at 10:15

## 2025-02-19 RX ADMIN — SODIUM CHLORIDE 1000 ML: 9 INJECTION, SOLUTION INTRAVENOUS at 10:25

## 2025-02-19 RX ADMIN — FAMOTIDINE 20 MG: 20 TABLET, FILM COATED ORAL at 20:41

## 2025-02-19 RX ADMIN — LEVETIRACETAM 1000 MG: 100 INJECTION INTRAVENOUS at 10:26

## 2025-02-19 RX ADMIN — ZONISAMIDE 400 MG: 100 CAPSULE ORAL at 20:41

## 2025-02-19 RX ADMIN — SODIUM CHLORIDE 600 ML/HR: 3 INJECTION, SOLUTION INTRAVENOUS at 11:30

## 2025-02-19 RX ADMIN — DEXTROSE MONOHYDRATE: 50 INJECTION, SOLUTION INTRAVENOUS at 14:24

## 2025-02-19 RX ADMIN — SODIUM CHLORIDE, PRESERVATIVE FREE 10 ML: 5 INJECTION INTRAVENOUS at 20:42

## 2025-02-19 ASSESSMENT — PAIN SCALES - GENERAL
PAINLEVEL_OUTOF10: 0
PAINLEVEL_OUTOF10: 6
PAINLEVEL_OUTOF10: 9

## 2025-02-19 ASSESSMENT — PAIN DESCRIPTION - ORIENTATION
ORIENTATION: LEFT
ORIENTATION: RIGHT

## 2025-02-19 ASSESSMENT — PAIN DESCRIPTION - LOCATION
LOCATION: CHEST
LOCATION: LEG

## 2025-02-19 ASSESSMENT — LIFESTYLE VARIABLES
HOW OFTEN DO YOU HAVE A DRINK CONTAINING ALCOHOL: NEVER
HOW MANY STANDARD DRINKS CONTAINING ALCOHOL DO YOU HAVE ON A TYPICAL DAY: PATIENT DOES NOT DRINK

## 2025-02-19 NOTE — H&P
Hospital Medicine History & Physical      PCP: No primary care provider on file.    Date of Admission: 2/19/2025    Date of Service: Pt seen/examined on 02/19/2025 and Admitted to Inpatient with expected LOS greater than two midnights due to medical therapy.     Chief Complaint: Seizures      History Of Present Illness:   54 y.o. male who presented to [facility] with seizure activity, had another episode in emergency department continue to be confused thereafter, found to have hyponatremia nephrology consulted.  To note the patient apparently was admitted recently to  February 4 for multiple breakthrough seizures, at that time discharged on Vimpat and Zonegran, discussed with neurology who is okay to be admitted at [facility] patient will be admitted to ICU discussed with EDMD    Past Medical History:          Diagnosis Date    Acute alcoholism ([facility])     Anemia, unspecified     Anxiety disorder     Avitaminosis D     Cerebral palsy (HCC)     Epilepsy with altered consciousness with intractable epilepsy (HCC)     Insomnia, unspecified     Neurocognitive disorder     Noncompliance with medication regimen     Psychoactive substance abuse (HCC)     Simple schizophrenia, subchronic condition ([facility])     TBI (traumatic brain injury) ([facility])        Past Surgical History:      No past surgical history on file.    Medications Prior to Admission:      Prior to Admission medications    Medication Sig Start Date End Date Taking? Authorizing Provider   acetaminophen (TYLENOL) 500 MG tablet Take 1 tablet by mouth every 6 hours as needed 1/18/25  Yes Wily Kirby MD   celecoxib (CELEBREX) 100 MG capsule Take 1 capsule by mouth 2 times daily as needed 2/15/25  Yes ProviderWily MD   diclofenac sodium (VOLTAREN) 1 % GEL Apply 4 g topically 3 times daily 2/15/25  Yes Wily Kirby MD   famotidine (PEPCID) 20 MG tablet Take 1 tablet by mouth 2 times daily 1/18/25  Yes Wily Kirby MD   lacosamide  monitor  Indeterminate 2.0 cm hypodense lesion in the liver consider MRI or CT with liver protocol when more stable or as outpatient    Total critical care time including but not limited to ordering and following on critical labs ordering critical IV medication in a patient with potential life-threatening complications, seizure time 33 minutes  DVT Prophylaxis: Lovenox  Diet: No diet orders on file  Code Status: Prior    Dispo -ICU       Johny Mcgee MD    Thank you No primary care provider on file. for the opportunity to be involved in this patient's care. If you have any questions or concerns please feel free to contact me at [phone].    Comment: Please note this report has been produced using speech recognition software and may contain errors related to that system including errors in grammar, punctuation, and spelling, as well as words and phrases that may be inappropriate. If there are any questions or concerns, please feel free to contact the dictating provider for clarification.

## 2025-02-19 NOTE — ED PROVIDER NOTES
through to a  at [facility].  The story is that the patient had stayed with them for about 6 months and was sober during that time.  He then discharged himself from the facility and apparently has not been taking his seizure medications and has been drinking heavily on a daily basis again.  He was just readmitted to [facility] 2 days ago.    1118 consult placed for neurology at [facility]. Patient is postictal but is responding to painful stimuli    1125 I spoke with NP Nika Holm. Transfer to  where he receives all of his care.    1129 Call out to  Neurology    1158 I spoke with neurologist Dr. Funk, per my conversation with him he cannot do continuous EEG monitoring on the floor and recommends neuro ICU.  I was transferred to ICU neurotea.  I was unable to get through secondary to connectivity issues on their end.      1227  calling me back to speak with Dr. Lima and Dr. Padron.  They continue to have connectivity issues and was unable to connect with me.  Call center stated that they would continue to try.    1420 [facility] attempted to reach out to  and was informed that they are unable to do conference calls due to problems with connectivity.  Reconsulted [facility].  Patient needs continuous EEG monitoring.  We cannot do that at this facility.  He has been reevaluated multiple times and he remains postictal.    Patient's repeat sodium is 132.      1420 Dr. Her in the ED reeavaluating the pt.    1425 I spoke to nurse practitioner Nika Holm.  Per her recommendation will speak to [facility] neurology.    1450 I spoke with [facility] neurology Jenaro.  Recommends transfer with EEG monitoring.  Patient is responding to painful stimuli and trying to pull his IV but he is not back to his baseline.    1500 I spoke with Nika EDWARD. She will call Jenaro at [facility] and ask for someone at [place] to evaluate the patient in the emergency department  before accepting him as a transfer to [facility].    5330 I handed off care of this patient to Dr. Zelaya.  The patient is starting to wake up and responding to painful stimuli but he is not speaking.      CRITICAL CARE NOTE:  There was a high probability of clinically significant life-threatening deterioration of the patient's condition requiring my urgent intervention.    I personally saw the patient and independently provided 65 minutes of non-concurrent critical care out of the total shared critical care time provided.    This includes multiple reevaluations, vital sign monitoring, pulse oximetry monitoring, telemetry monitoring, clinical response to the IV medications, reviewing the nursing notes, consultation time, dictation/documentation time, and interpretation of the labwork. (This time excludes time spent performing procedures).      FINAL IMPRESSION    1. Seizures (HCC)    2. Hyponatremia    3. Post-ictal state (HCC)        PLAN:  Admitted to [facility]    (Please note that this note was completed with a voice recognition program.  Every attempt was made to edit the dictations, but inevitably there remain words that are mis-transcribed.)          Kat Forrest, OBDULIA - CNP  02/25/25 1943

## 2025-02-19 NOTE — ED TRIAGE NOTES
Pt presents to the ED with c/c of witnessed seizure by facility staff, states was three minutes. Has hx of seizures, unsure if compliant medications. Pt is A&Ox2 (person and place).

## 2025-02-19 NOTE — CONSULTS
Neurology Consult Note  Reason for Consult: seizures    Chief complaint: unable to adequately obtain from the patient    Shamar Flynn, DO asked me to see Francisco Espinoza in consultation for evaluation of seizures    History of Present Illness:  Francisco Espinoza is a 54 y.o. male who presents with seizures.     I obtained my information via chart review and discussion w/ other medical staff members. The patient is unable to provide any reliable information at this time.    The patient has a hx of epilepsy, ?TBI, and alcohol use.  He was recently seen at  on 2/4 for multiple breakthrough seizures. This looks to have been a recurrent issue due to non compliance.  He was discharged on Vimpat 100 mg BID and it was recommended that his Zonegran be increased to 400 at night.  I don't see that he is supposed to be on Keppra as well.     It is my understanding that he had a witnessed seizure that lasted 3 minutes at his facility.  This is not well described.  He had another tonic clonic seizure here that lasted for 1 minutes. He was given lorazepam and loaded w/ Keppra.      Na was 121.  CT head no acute intracranial findings.     Currently he is resting in the ED.  He is easily able to be aroused.  He does verbalize.  He is capable of following some commands. No further seizures.     Medical History:  Past Medical History:   Diagnosis Date    Acute alcoholism ([facility])     Anemia, unspecified     Anxiety disorder     Avitaminosis D     Cerebral palsy (HCC)     Epilepsy with altered consciousness with intractable epilepsy (HCC)     Insomnia, unspecified     Neurocognitive disorder     Noncompliance with medication regimen     Psychoactive substance abuse (HCC)     Simple schizophrenia, subchronic condition (HCC)     TBI (traumatic brain injury) ([facility])      No past surgical history on file.    No current facility-administered medications on file prior to encounter.     Current Outpatient Medications on File Prior to Encounter  Unknown   Smokeless Tobacco Not on file     Social History     Substance and Sexual Activity   Drug Use Not on file     Social History     Substance and Sexual Activity   Alcohol Use Yes     ROS - afebrile.  Chart reviewed.      Exam  Blood pressure 93/67, pulse 85, temperature 98.3 °F (36.8 °C), temperature source Oral, resp. rate 20, height 1.753 m (5' 9\"), weight 63.8 kg (140 lb 10.5 oz), SpO2 100%.  Constitutional    Vital signs: BP, HR, and RR reviewed   General drowsy though can be aroused.    Eyes: unable to visualize the fundi  Psychiatric: no psychotic behavior noted.  Neurologic  Mental status:   orientation to person.  Can't really say appropriate place, time, situation.     Attention poor.     Language no gross aphasia.     Comprehension he was able to follow a couple of simple commands.    Cranial nerves:   CN2: unable to accurately assess VF.    CN 3,4,6: no forced gaze deviation for preference.  PERRLA.   CN7: face grossly symmetric   CN8: hearing grossly intact  CN12: tongue midline with protrusion  Strength: he appears to be moving all 4 limbs w/out any obvious signs of focal paresis.  Does hand grasp and wiggle toes when asked.    Sensory: briskly reactive to any sort of noxious pain stimulus to limbs and trapezius pressure.   Cerebellar/coordination: finger nose finger hard to assess.    Tone: normal in all 4 extremities  Gait: deferred given mentation at this time.     Labs  Glucose 129  Na 121 -> 132  K 4.0  Cl 88  BUN 10  Cr 0.9  Ca 8.9    WBC 5.2  Hg 11.4  Platelets 207    UA negative  Drug screen negative    Studies  CT head w/o 2/19/25, independently reviewed  IMPRESSION:  No acute intracranial abnormality.     CT chest 2/19/25  IMPRESSION:  1. Bibasilar areas of ground-glass attenuation favoring atelectasis over  infection/inflammation.  2. Indeterminate 2.0 cm hypodense lesion in the posterior right liver with  mild peripheral wall calcification.  Findings most likely for complex

## 2025-02-19 NOTE — PROGRESS NOTES
Medication Reconciliation    List of medications patient is currently taking is complete.     Source of information: 1. [facility] med list                                      2. EPIC records        Notes regarding home medications:   1. Removed divalproex, levetiracetam, and lorazepam  2. Adjusted zonisamide to 400mg nightly      Rogelio Hoffman, [facility]   2/19/2025  5:34 PM

## 2025-02-19 NOTE — ED PROVIDER NOTES
[facility]    EMERGENCY DEPARTMENT ENCOUNTER     Location: [address] ICU  2/19/2025  Note Started: 8:38 AM EST 2/19/25      Patient Identification  Francisco Espinoza is a 54 y.o. male  Chief Complaint   Patient presents with    Seizures     Pt presents to the ED with c/c of witnessed seizure by facility staff, states was three minutes. Has hx of seizures, unsure if compliant medications. Pt is A&Ox2 (person and place).        HPI:Francisco Espinoza was evaluated in the Emergency Department for seizure.  EMS reports patient is here because of a seizure that occurred at his facility.  No seizure activity en route.  On my evaluation the patient was confused and was not very helpful with the history. Although initial history and physical exam information was obtained by MEAGAN/NPP/MD/DO (who also dictated a record of this visit), I personally saw the patient and performed and made/approved the management plan and take responsibility for the patient management.      PHYSICAL EXAM:  Patient awake but confused.  Following commands.  Heart regular rate and rhythm.  No evidence of injury.    EKG Interpreted by me  Normal sinus rhythm with rate of 89, left axis deviation, normal QTc, no definite ischemic findings and no prior EKG available for comparison.    CT CHEST WO CONTRAST   Preliminary Result   1. Bibasilar areas of ground-glass attenuation favoring atelectasis over   infection/inflammation.   2. Indeterminate 2.0 cm hypodense lesion in the posterior right liver with   mild peripheral wall calcification.  Findings most likely for complex cysts   but is otherwise indeterminate on current exam.  Recommend correlation with   remote imaging if available otherwise nonemergent liver MRI or CT can be   considered.   3. Punctate nonobstructing left renal calculus.         XR HIP RIGHT (2-3 VIEWS)   Final Result   1. No acute abnormality.         XR CHEST PORTABLE   Preliminary Result   Patchy airspace and interstitial opacities. Findings may  represent pulmonary   edema or multifocal pneumonia.  An atypical or viral pneumonia is not   excluded.      Probable remote left rib fractures.         CT HEAD WO CONTRAST   Final Result   No acute intracranial abnormality.         CT CERVICAL SPINE WO CONTRAST   Final Result   No acute abnormality of the cervical spine.             Patient seen and evaluated.  Relevant records reviewed.  MDM     Given that the patient did not entirely returned to normal mental status with concern for status epilepticus and for this reason consulted [facility] neurocritical care.  They advised that the patient gets most of his care from  and should be transferred there.  Of note receive a [facility] does not have continuous EEG.        CLINICAL IMPRESSION  1. Seizures (HCC)    2. Hyponatremia    3. Post-ictal state (HCC)          IJermaine MD, am the primary clinician of record.   I personally saw the patient and independently provided 30 minutes of non-concurrent critical care out of the total shared critical care time excluding separately billable procedures.    This chart was generated in part by using Dragon Dictation system and may contain errors related to that system including errors in grammar, punctuation, and spelling, as well as words and phrases that may be inappropriate. If there are any questions or concerns please feel free to contact the dictating provider for clarification.     Jermaine Benavidez MD   [employer]        Jermaine Benavidez MD  02/20/25 0826

## 2025-02-19 NOTE — ED PROVIDER NOTES
excluding separately reportable procedures.  There was a high probability of clinically significant/life threatening deterioration in the patient's condition which required my urgent intervention.      CONSULTS:  IP CONSULT TO NEPHROLOGY  IP CONSULT TO NEUROLOGY    PROCEDURES:  Unless otherwise noted below, none     Procedures        FINAL IMPRESSION      1. Seizures (HCC)    2. Hyponatremia    3. Post-ictal state (HCC)          DISPOSITION/PLAN   DISPOSITION Decision To Transfer 02/19/2025 11:24:43 AM      PATIENT REFERRED TO:  No follow-up provider specified.    DISCHARGE MEDICATIONS:  New Prescriptions    No medications on file     Controlled Substances Monitoring:          No data to display                (Please note that portions of this note were completed with a voice recognition program.  Efforts were made to edit the dictations but occasionally words are mis-transcribed.)    SHAMAR MEHTA DO (electronically signed)  Attending Emergency Physician        Shamar Mehta DO  02/19/25 4109

## 2025-02-19 NOTE — CONSULTS
Nephrology Consult Note   [email or web address]      Reason for consultation: Hyponatremia    History of Present Illness: Francisco Espinoza is a 53 yo male with a PMHx of epilepsy, alcoholism, substance abuse,TBI, cerebral palsy. Patient presents to  ED on 2/19/2025 with seizure activity. Patient seized again in the ED. He received IV ativan here and is currently post-ictal. Patient is unable to participate in history obtaining. Apparently patient had been drinking in excess for several days and not taking his medication. Na+ upon arrival is 121 mmol/L. SBP is soft and in the 90s upon exam.     Subjective:      Patient seen and examined. Labs and chart reviewed. Seen in the ED on RA. No family at bedside.     Patient review of systems: SMILEY -- AMS    Past Medical History:   Diagnosis Date    Acute alcoholism (HCC)     Anemia, unspecified     Anxiety disorder     Avitaminosis D     Cerebral palsy (HCC)     Epilepsy with altered consciousness with intractable epilepsy (HCC)     Insomnia, unspecified     Neurocognitive disorder     Noncompliance with medication regimen     Psychoactive substance abuse (HCC)     Simple schizophrenia, subchronic condition (HCC)     TBI (traumatic brain injury) (HCC)        No past surgical history on file.    Allergies:  No Known Allergies     Social Determinants of Health with Concerns     Tobacco Use: Medium Risk (2/4/2025)    Received from  [facility]    Patient History     Smoking Tobacco Use: Former     Smokeless Tobacco Use: Never     Passive Exposure: Not on file   Alcohol Use: Not At Risk (2/19/2025)    AUDIT-C     Frequency of Alcohol Consumption: Never     Average Number of Drinks: Patient does not drink     Frequency of Binge Drinking: Never   Recent Concern: Alcohol Use - Alcohol Misuse (1/25/2025)    Received from  [facility]    AUDIT-C     Frequency of Alcohol Consumption: 2-3 times a week     Average Number of Drinks: 3 or 4     Frequency of Binge Drinking: Less than monthly    Transportation Needs: Patient Declined (2025)    Received from [facility]    PRAPARE - Transportation     Lack of Transportation (Medical): Patient declined     Lack of Transportation (Non-Medical): Patient declined   Stress: Stress Concern Present (2023)    Received from [facility]    [employer] [place] [employer] - Occupational Stress Questionnaire     Feeling of Stress : Very much   Social Connections: Socially Isolated (2023)    Received from [facility]    Social Connection and Isolation Panel [NHANES]     Frequency of Communication with Friends and Family: More than three times a week     Frequency of Social Gatherings with Friends and Family: Not on file     Attends Scientologist Services: Never     Active Member of Clubs or Organizations: No     Attends Club or Organization Meetings: Never     Marital Status: Never    Intimate Partner Violence: Not on file   Housing Stability: Patient Declined (2025)    Received from [facility]    Housing Stability Vital Sign     Unable to Pay for Housing in the Last Year: Patient declined     Number of Times Moved in the Last Year: 0     Homeless in the Last Year: Patient declined   Interpersonal Safety: Not on file   Utilities: Patient Declined (2025)    Received from [facility]     Threatened with loss of utilities: Patient declined        Scheduled Meds:   lacosamide  100 mg Oral Once        3% sodium chloride         PRN Meds:    Physical Exam:    TEMPERATURE:  Current - Temp: 98.3 °F (36.8 °C); Max - Temp  Av.3 °F (36.8 °C)  Min: 98.3 °F (36.8 °C)  Max: 98.3 °F (36.8 °C)  RESPIRATIONS RANGE: Resp  Av.5  Min: 17  Max: 25  PULSE RANGE: Pulse  Av.4  Min: 81  Max: 110  BLOOD PRESSURE RANGE:  Systolic (24hrs), Av , Min:102 , Max:121   ; Diastolic (24hrs), Av, Min:67, Max:94    24HR INTAKE/OUTPUT:  No intake or output data in the 24 hours ending 25 1108    Patient Vitals for the past 96 hrs (Last

## 2025-02-19 NOTE — ED NOTES
Pt had witnessed seizure that last 1 minute. Suction set up and used, pt placed on 4L nasal cannula. Pt turned on left side and head supported. 2mg Ativan given PIV per verbal order by ED MD.

## 2025-02-20 LAB
ALBUMIN SERPL-MCNC: 4 G/DL (ref 3.4–5)
ALBUMIN/GLOB SERPL: 1.2 {RATIO} (ref 1.1–2.2)
ALP SERPL-CCNC: 46 U/L (ref 40–129)
ALT SERPL-CCNC: 26 U/L (ref 10–40)
ANION GAP SERPL CALCULATED.3IONS-SCNC: 10 MMOL/L (ref 3–16)
ANION GAP SERPL CALCULATED.3IONS-SCNC: 11 MMOL/L (ref 3–16)
ANION GAP SERPL CALCULATED.3IONS-SCNC: 12 MMOL/L (ref 3–16)
ANION GAP SERPL CALCULATED.3IONS-SCNC: 9 MMOL/L (ref 3–16)
AST SERPL-CCNC: 25 U/L (ref 15–37)
BASOPHILS # BLD: 0 K/UL (ref 0–0.2)
BASOPHILS NFR BLD: 0.5 %
BILIRUB DIRECT SERPL-MCNC: 0.3 MG/DL (ref 0–0.3)
BILIRUB INDIRECT SERPL-MCNC: 0.3 MG/DL (ref 0–1)
BILIRUB SERPL-MCNC: 0.6 MG/DL (ref 0–1)
BUN SERPL-MCNC: 10 MG/DL (ref 7–20)
BUN SERPL-MCNC: 10 MG/DL (ref 7–20)
BUN SERPL-MCNC: 8 MG/DL (ref 7–20)
BUN SERPL-MCNC: 9 MG/DL (ref 7–20)
BUN SERPL-MCNC: 9 MG/DL (ref 7–20)
CALCIUM SERPL-MCNC: 8.2 MG/DL (ref 8.3–10.6)
CALCIUM SERPL-MCNC: 8.6 MG/DL (ref 8.3–10.6)
CALCIUM SERPL-MCNC: 8.9 MG/DL (ref 8.3–10.6)
CALCIUM SERPL-MCNC: 9.1 MG/DL (ref 8.3–10.6)
CALCIUM SERPL-MCNC: 9.1 MG/DL (ref 8.3–10.6)
CALCIUM SERPL-MCNC: 9.2 MG/DL (ref 8.3–10.6)
CALCIUM SERPL-MCNC: 9.2 MG/DL (ref 8.3–10.6)
CALCIUM SERPL-MCNC: 9.3 MG/DL (ref 8.3–10.6)
CHLORIDE SERPL-SCNC: 101 MMOL/L (ref 99–110)
CHLORIDE SERPL-SCNC: 102 MMOL/L (ref 99–110)
CHLORIDE SERPL-SCNC: 103 MMOL/L (ref 99–110)
CHLORIDE SERPL-SCNC: 103 MMOL/L (ref 99–110)
CHLORIDE SERPL-SCNC: 90 MMOL/L (ref 99–110)
CHLORIDE SERPL-SCNC: 95 MMOL/L (ref 99–110)
CO2 SERPL-SCNC: 20 MMOL/L (ref 21–32)
CO2 SERPL-SCNC: 21 MMOL/L (ref 21–32)
CO2 SERPL-SCNC: 22 MMOL/L (ref 21–32)
CO2 SERPL-SCNC: 22 MMOL/L (ref 21–32)
CO2 SERPL-SCNC: 23 MMOL/L (ref 21–32)
CO2 SERPL-SCNC: 24 MMOL/L (ref 21–32)
CREAT SERPL-MCNC: 0.9 MG/DL (ref 0.9–1.3)
CREAT SERPL-MCNC: 1 MG/DL (ref 0.9–1.3)
CREAT SERPL-MCNC: 1 MG/DL (ref 0.9–1.3)
DEPRECATED RDW RBC AUTO: 13.3 % (ref 12.4–15.4)
EOSINOPHIL # BLD: 0 K/UL (ref 0–0.6)
EOSINOPHIL NFR BLD: 0.3 %
FERRITIN SERPL IA-MCNC: 62.9 NG/ML (ref 30–400)
FLUAV + FLUBV AG NOSE IA.RAPID: NOT DETECTED
FLUAV + FLUBV AG NOSE IA.RAPID: NOT DETECTED
FOLATE SERPL-MCNC: 11.9 NG/ML (ref 4.78–24.2)
GFR SERPLBLD CREATININE-BSD FMLA CKD-EPI: 90 ML/MIN/{1.73_M2}
GFR SERPLBLD CREATININE-BSD FMLA CKD-EPI: 90 ML/MIN/{1.73_M2}
GFR SERPLBLD CREATININE-BSD FMLA CKD-EPI: >90 ML/MIN/{1.73_M2}
GLUCOSE SERPL-MCNC: 102 MG/DL (ref 70–99)
GLUCOSE SERPL-MCNC: 107 MG/DL (ref 70–99)
GLUCOSE SERPL-MCNC: 109 MG/DL (ref 70–99)
GLUCOSE SERPL-MCNC: 110 MG/DL (ref 70–99)
GLUCOSE SERPL-MCNC: 115 MG/DL (ref 70–99)
GLUCOSE SERPL-MCNC: 117 MG/DL (ref 70–99)
HCT VFR BLD AUTO: 37.2 % (ref 40.5–52.5)
HGB BLD-MCNC: 12.4 G/DL (ref 13.5–17.5)
IRON SATN MFR SERPL: 34 % (ref 20–50)
IRON SERPL-MCNC: 107 UG/DL (ref 59–158)
LYMPHOCYTES # BLD: 0.9 K/UL (ref 1–5.1)
LYMPHOCYTES NFR BLD: 17.8 %
MCH RBC QN AUTO: 30.4 PG (ref 26–34)
MCHC RBC AUTO-ENTMCNC: 33.3 G/DL (ref 31–36)
MCV RBC AUTO: 91.1 FL (ref 80–100)
MONOCYTES # BLD: 0.5 K/UL (ref 0–1.3)
MONOCYTES NFR BLD: 10.4 %
NEUTROPHILS # BLD: 3.5 K/UL (ref 1.7–7.7)
NEUTROPHILS NFR BLD: 71 %
PHOSPHATE SERPL-MCNC: 3.3 MG/DL (ref 2.5–4.9)
PLATELET # BLD AUTO: 225 K/UL (ref 135–450)
PMV BLD AUTO: 8.3 FL (ref 5–10.5)
POTASSIUM SERPL-SCNC: 3.7 MMOL/L (ref 3.5–5.1)
POTASSIUM SERPL-SCNC: 3.9 MMOL/L (ref 3.5–5.1)
POTASSIUM SERPL-SCNC: 3.9 MMOL/L (ref 3.5–5.1)
POTASSIUM SERPL-SCNC: 4 MMOL/L (ref 3.5–5.1)
POTASSIUM SERPL-SCNC: 4 MMOL/L (ref 3.5–5.1)
POTASSIUM SERPL-SCNC: 4.1 MMOL/L (ref 3.5–5.1)
POTASSIUM SERPL-SCNC: 4.3 MMOL/L (ref 3.5–5.1)
POTASSIUM SERPL-SCNC: 4.6 MMOL/L (ref 3.5–5.1)
PROT SERPL-MCNC: 7.3 G/DL (ref 6.4–8.2)
RBC # BLD AUTO: 4.08 M/UL (ref 4.2–5.9)
SARS-COV-2 RDRP RESP QL NAA+PROBE: NOT DETECTED
SODIUM SERPL-SCNC: 123 MMOL/L (ref 136–145)
SODIUM SERPL-SCNC: 128 MMOL/L (ref 136–145)
SODIUM SERPL-SCNC: 133 MMOL/L (ref 136–145)
SODIUM SERPL-SCNC: 133 MMOL/L (ref 136–145)
SODIUM SERPL-SCNC: 134 MMOL/L (ref 136–145)
SODIUM SERPL-SCNC: 135 MMOL/L (ref 136–145)
TIBC SERPL-MCNC: 312 UG/DL (ref 260–445)
TRANSFERRIN SERPL-MCNC: 253 MG/DL (ref 200–360)
TROPONIN, HIGH SENSITIVITY: <6 NG/L (ref 0–22)
VIT B12 SERPL-MCNC: 882 PG/ML (ref 211–911)
WBC # BLD AUTO: 4.9 K/UL (ref 4–11)

## 2025-02-20 PROCEDURE — 97530 THERAPEUTIC ACTIVITIES: CPT

## 2025-02-20 PROCEDURE — 84100 ASSAY OF PHOSPHORUS: CPT

## 2025-02-20 PROCEDURE — 36415 COLL VENOUS BLD VENIPUNCTURE: CPT

## 2025-02-20 PROCEDURE — 84466 ASSAY OF TRANSFERRIN: CPT

## 2025-02-20 PROCEDURE — 80053 COMPREHEN METABOLIC PANEL: CPT

## 2025-02-20 PROCEDURE — 87635 SARS-COV-2 COVID-19 AMP PRB: CPT

## 2025-02-20 PROCEDURE — 6370000000 HC RX 637 (ALT 250 FOR IP): Performed by: INTERNAL MEDICINE

## 2025-02-20 PROCEDURE — 87502 INFLUENZA DNA AMP PROBE: CPT

## 2025-02-20 PROCEDURE — 93005 ELECTROCARDIOGRAM TRACING: CPT | Performed by: HOSPITALIST

## 2025-02-20 PROCEDURE — 97162 PT EVAL MOD COMPLEX 30 MIN: CPT

## 2025-02-20 PROCEDURE — 2500000003 HC RX 250 WO HCPCS: Performed by: INTERNAL MEDICINE

## 2025-02-20 PROCEDURE — 2580000003 HC RX 258: Performed by: HOSPITALIST

## 2025-02-20 PROCEDURE — 85025 COMPLETE CBC W/AUTO DIFF WBC: CPT

## 2025-02-20 PROCEDURE — 6360000002 HC RX W HCPCS: Performed by: INTERNAL MEDICINE

## 2025-02-20 PROCEDURE — 82728 ASSAY OF FERRITIN: CPT

## 2025-02-20 PROCEDURE — 2060000000 HC ICU INTERMEDIATE R&B

## 2025-02-20 PROCEDURE — 94760 N-INVAS EAR/PLS OXIMETRY 1: CPT

## 2025-02-20 PROCEDURE — 84484 ASSAY OF TROPONIN QUANT: CPT

## 2025-02-20 PROCEDURE — 82746 ASSAY OF FOLIC ACID SERUM: CPT

## 2025-02-20 PROCEDURE — 97166 OT EVAL MOD COMPLEX 45 MIN: CPT

## 2025-02-20 PROCEDURE — 82607 VITAMIN B-12: CPT

## 2025-02-20 PROCEDURE — 83540 ASSAY OF IRON: CPT

## 2025-02-20 PROCEDURE — 6360000002 HC RX W HCPCS

## 2025-02-20 PROCEDURE — 6370000000 HC RX 637 (ALT 250 FOR IP)

## 2025-02-20 PROCEDURE — 2580000003 HC RX 258

## 2025-02-20 RX ORDER — 0.9 % SODIUM CHLORIDE 0.9 %
250 INTRAVENOUS SOLUTION INTRAVENOUS ONCE
Status: COMPLETED | OUTPATIENT
Start: 2025-02-20 | End: 2025-02-20

## 2025-02-20 RX ORDER — NITROGLYCERIN 0.4 MG/1
0.4 TABLET SUBLINGUAL EVERY 5 MIN PRN
Status: DISCONTINUED | OUTPATIENT
Start: 2025-02-20 | End: 2025-02-24 | Stop reason: HOSPADM

## 2025-02-20 RX ORDER — MIDODRINE HYDROCHLORIDE 5 MG/1
2.5 TABLET ORAL
Status: DISCONTINUED | OUTPATIENT
Start: 2025-02-20 | End: 2025-02-24 | Stop reason: HOSPADM

## 2025-02-20 RX ORDER — DESMOPRESSIN ACETATE 4 UG/ML
1 INJECTION, SOLUTION INTRAVENOUS; SUBCUTANEOUS ONCE
Status: DISCONTINUED | OUTPATIENT
Start: 2025-02-20 | End: 2025-02-20

## 2025-02-20 RX ORDER — DESMOPRESSIN ACETATE 4 UG/ML
1 INJECTION, SOLUTION INTRAVENOUS; SUBCUTANEOUS ONCE
Status: COMPLETED | OUTPATIENT
Start: 2025-02-20 | End: 2025-02-20

## 2025-02-20 RX ADMIN — SODIUM CHLORIDE, PRESERVATIVE FREE 10 ML: 5 INJECTION INTRAVENOUS at 08:28

## 2025-02-20 RX ADMIN — MIDODRINE HYDROCHLORIDE 2.5 MG: 5 TABLET ORAL at 12:07

## 2025-02-20 RX ADMIN — DEXTROSE MONOHYDRATE: 50 INJECTION, SOLUTION INTRAVENOUS at 10:25

## 2025-02-20 RX ADMIN — DESMOPRESSIN ACETATE 1 MCG: 4 INJECTION, SOLUTION INTRAVENOUS; SUBCUTANEOUS at 11:11

## 2025-02-20 RX ADMIN — FAMOTIDINE 20 MG: 20 TABLET, FILM COATED ORAL at 21:39

## 2025-02-20 RX ADMIN — FAMOTIDINE 20 MG: 20 TABLET, FILM COATED ORAL at 08:19

## 2025-02-20 RX ADMIN — LIDOCAINE HYDROCHLORIDE: 20 SOLUTION ORAL at 21:45

## 2025-02-20 RX ADMIN — ACETAMINOPHEN 650 MG: 325 TABLET ORAL at 16:17

## 2025-02-20 RX ADMIN — LACOSAMIDE 100 MG: 100 TABLET, FILM COATED ORAL at 21:39

## 2025-02-20 RX ADMIN — ACETAMINOPHEN 650 MG: 325 TABLET ORAL at 08:26

## 2025-02-20 RX ADMIN — DEXTROSE MONOHYDRATE: 50 INJECTION, SOLUTION INTRAVENOUS at 02:26

## 2025-02-20 RX ADMIN — ZONISAMIDE 400 MG: 100 CAPSULE ORAL at 21:42

## 2025-02-20 RX ADMIN — ONDANSETRON 4 MG: 2 INJECTION, SOLUTION INTRAMUSCULAR; INTRAVENOUS at 16:17

## 2025-02-20 RX ADMIN — TRAZODONE HYDROCHLORIDE 50 MG: 50 TABLET ORAL at 21:39

## 2025-02-20 RX ADMIN — LACOSAMIDE 100 MG: 100 TABLET, FILM COATED ORAL at 08:19

## 2025-02-20 RX ADMIN — SODIUM CHLORIDE 250 ML: 9 INJECTION, SOLUTION INTRAVENOUS at 21:37

## 2025-02-20 RX ADMIN — NITROGLYCERIN 0.4 MG: 0.4 TABLET SUBLINGUAL at 23:06

## 2025-02-20 RX ADMIN — SODIUM CHLORIDE, PRESERVATIVE FREE 10 ML: 5 INJECTION INTRAVENOUS at 21:44

## 2025-02-20 ASSESSMENT — PAIN SCALES - GENERAL
PAINLEVEL_OUTOF10: 9
PAINLEVEL_OUTOF10: 0
PAINLEVEL_OUTOF10: 9
PAINLEVEL_OUTOF10: 0
PAINLEVEL_OUTOF10: 0
PAINLEVEL_OUTOF10: 9
PAINLEVEL_OUTOF10: 4

## 2025-02-20 ASSESSMENT — PAIN DESCRIPTION - LOCATION
LOCATION: GENERALIZED
LOCATION: GENERALIZED

## 2025-02-20 ASSESSMENT — PAIN DESCRIPTION - DESCRIPTORS: DESCRIPTORS: ACHING

## 2025-02-20 ASSESSMENT — PAIN SCALES - WONG BAKER
WONGBAKER_NUMERICALRESPONSE: NO HURT
WONGBAKER_NUMERICALRESPONSE: NO HURT

## 2025-02-20 NOTE — PROGRESS NOTES
4 Eyes Skin Assessment     NAME:  Francisco Espinoza  YOB: 1970  MEDICAL RECORD NUMBER:  [id number]    The patient is being assessed for  Transfer to New Unit    I agree that at least one RN has performed a thorough Head to Toe Skin Assessment on the patient. ALL assessment sites listed below have been assessed.      Areas assessed by both nurses:    Head, Face, Ears, Shoulders, Back, Chest, Arms, Elbows, Hands, Sacrum. Buttock, Coccyx, Ischium, Legs. Feet and Heels, and Under Medical Devices         Does the Patient have a Wound? No noted wound(s)       Damon Prevention initiated by RN: Yes  Wound Care Orders initiated by RN: No    Pressure Injury (Stage 3,4, Unstageable, DTI, NWPT, and Complex wounds) if present, place Wound referral order by RN under : No    New Ostomies, if present place, Ostomy referral order under : No     Nurse 1 eSignature: Electronically signed by Emi Patel RN on 2/20/25 at 6:19 PM EST    **SHARE this note so that the co-signing nurse can place an eSignature**    Nurse 2 eSignature: Electronically signed by Andressa Cabrera RN on 2/21/25 at 12:42 AM EST

## 2025-02-20 NOTE — PROGRESS NOTES
V2.0    [facility] [place] Progress Note      Name:  Francisco Espinoza /Age/Sex: 1970  (54 y.o. male)   MRN & CSN:  [id number] & [id number] Encounter Date/Time: 2025 8:57 AM EDT   Location:  [id number] PCP: No primary care provider on file.     Attending:Wagner Davies MD       Hospital Day: 2      HPI :       Subjective:     Reports no complaints today.  No dizziness or lightheadedness.  No seizures activity overnight    Review of Systems:      Pertinent positives and negatives discussed in HPI    Objective:     Intake/Output Summary (Last 24 hours) at 2025 0923  Last data filed at 2025 0722  Gross per 24 hour   Intake 1444.65 ml   Output 6015 ml   Net -4570.35 ml      Vitals:   Vitals:    25 0600 25 0700 25 0744 25 0800   BP: 100/78 94/75  98/73   Pulse: 77 75 76 71   Resp: 17 16 19 15   Temp:    100.1 °F (37.8 °C)   TempSrc:    Oral   SpO2: 98% 98% 98% 97%   Weight:       Height:             Physical Exam:      Physical Exam Performed:    BP 98/73   Pulse 71   Temp 100.1 °F (37.8 °C) (Oral)   Resp 15   Ht 1.753 m (5' 9\")   Wt 62.5 kg (137 lb 12.6 oz)   SpO2 97%   BMI 20.35 kg/m²     General appearance: No apparent distress, appears stated age and cooperative.  HEENT: Pupils equal, round, and reactive to light. Conjunctivae/corneas clear.  Neck: Supple, with full range of motion. No jugular venous distention. Trachea midline.  Respiratory:  Normal respiratory effort. Clear to auscultation, bilaterally without Rales/Wheezes/Rhonchi.  Cardiovascular: Regular rate and rhythm with normal S1/S2 without murmurs, rubs or gallops.  Abdomen: Soft, non-tender, non-distended with normal bowel sounds.  Musculoskeletal: No clubbing, cyanosis or edema bilaterally.  Full range of motion without deformity.  Neurologic:  Neurovascularly intact without any focal sensory/motor deficits. Cranial nerves: II-XII intact, grossly non-focal.  Psychiatric: Alert and oriented, thought content         Medical Decision Making:  The following items were considered in medical decision making:  Discussion of patient care with other providers  Reviewed clinical lab tests  Reviewed radiology tests  Reviewed other diagnostic tests/interventions  Independent review of radiologic images  Microbiology cultures and other micro tests reviewed        Electronically signed by Wagner Davies MD on 2/20/2025 at 9:23 AM  Comment: Please note this report has been produced using speech recognition software and may contain errors related to that system including errors in grammar, punctuation, and spelling, as well as words and phrases that may be inappropriate. If there are any questions or concerns, please feel free to contact the dictating provider for clarification.

## 2025-02-20 NOTE — PROGRESS NOTES
Patient admitted to ICU bed 2115 from ED. Handoff received from Alissa MORALEZ. Four eyes skin assessment completed. Patient awake and follows commands but refuses to talk to RN. Was able to mumble name but when asked his birth date he points to his arm band. When asked if in pain, he points all over his body. Axillary temp 100.7 F. PRN tylenol given for temp and pain. Patient does not answer any other questions, will just look away from RN. Patient did follow commands in BUE & BLE. D5 infusing at 100mL/hr.   Electronically signed by Kathy Grayson RN on 2/19/2025 at 7:24 PM

## 2025-02-20 NOTE — PROGRESS NOTES
Impairments: Decreased endurance;Decreased functional mobility ;Decreased ADL status;Decreased balance;Decreased high-level IADLs;Decreased cognition;Decreased sensation  Assessment: Pt is a 55 yo M admitted with seizure, pt had witnessed seizure at his facility and another seizure during this hospitalization. PMH includes CP, developmental delay and cognitive impairment, prior TBI, PNES, EtOH use c/b withdrawal seizures, refractory epilepsy. Previously seen at  (early Feb 2025) for seizures, noncompliant per chart. Neurology consulted: \"Patient had break through seizures in the setting of hyponatremia.\" PTA - pt typically lives at home with his mother, CHRISTINA, typically fully IND without device per pt report. Most recently admitted to SNF, working with therapies on \"walking\" - pt reporting IND ADLs. Today - pt completed bed mobility SBA, fxl txs SBA, amb without device household distances in room CGA for safety, overall steady gait, no LOB. Grooming in stance SBA. Dep toileting (elizabeth). Anticipate pt to require up to CGA full ADL completion based on observed strength, balance, activity tolerance. Pt with flat affect, minimally conversing with therapists throughout session, appears to demo impaired safety awareness. BUE strength WFL. Cont acute OT to address above deficits. Anticipate d/c to home with family assist PRN and cont therapy (2-3x/week) to ease transition to home, facilitate return to PLOF. Pt may benefit from shower chair for seated bathing at home, fall prevention, energy conservation.  Treatment Diagnosis: impaired balance, activity tolerance, ADL/IADLs, mobility/txs  Prognosis: Good  Decision Making: Medium Complexity  Exam: see above  REQUIRES OT FOLLOW-UP: Yes  Activity Tolerance  Activity Tolerance: Patient Tolerated treatment well     Plan  Occupational Therapy Plan  Times Per Week: 3-5x  Times Per Day: Once a day  Current Treatment Recommendations: Balance training, Functional mobility training,  Term Goals : STG = LTG  Patient Goals   Patient goals : to go home      Therapy Time   Individual Concurrent Group Co-treatment   Time In 1000         Time Out 1030         Minutes 30         Timed Code Treatment Minutes: 15 Minutes +15min darryl Moreno OTR/L [id number]

## 2025-02-20 NOTE — PROGRESS NOTES
by mouth daily as needed    nicotine (NICODERM CQ) 21 MG/24HR Place 1 patch onto the skin daily    ondansetron (ZOFRAN-ODT) 8 MG TBDP disintegrating tablet Take 1 tablet by mouth every 8 hours as needed    ergocalciferol (ERGOCALCIFEROL) 1.25 MG (57635 UT) capsule Take 1 capsule by mouth once a week Every friday    zonisamide (ZONEGRAN) 100 MG capsule Take 2 capsules by mouth nightly Give 2 capsules by mouth at bedtime for seizures    LORazepam (ATIVAN) 0.5 MG tablet Take 1 tablet by mouth in the morning, at noon, and at bedtime. Max Daily Amount: 1.5 mg    LORazepam (ATIVAN) 2 MG/ML injection Inject 0.5 mLs into the muscle every 15 minutes as needed (witnessed seizures). Call if IM does not work Max Daily Amount: 96 mg    divalproex (DEPAKOTE ER) 250 MG extended release tablet Take 1 tablet by mouth in the morning and at bedtime Give 1 tab by mouth two times a day for mood disorder    levETIRAcetam (KEPPRA) 500 MG tablet Take 1 tablet by mouth 2 times daily    traZODone (DESYREL) 50 MG tablet Take 1 tablet by mouth nightly     No Known Allergies    No family history on file.    Social History     Tobacco Use   Smoking Status Unknown   Smokeless Tobacco Not on file     Social History     Substance and Sexual Activity   Drug Use Not on file     Social History     Substance and Sexual Activity   Alcohol Use Yes     ROS - low grade temperature.      Exam  Blood pressure 98/73, pulse 71, temperature 100.1 °F (37.8 °C), temperature source Oral, resp. rate 15, height 1.753 m (5' 9\"), weight 62.5 kg (137 lb 12.6 oz), SpO2 97%.  Constitutional    Vital signs: BP, HR, and RR reviewed   General alert.  No distress.      Eyes: unable to visualize the fundi  Psychiatric: no psychotic behavior noted.  Neurologic  Mental status:   orientation to person, place.     Attention needs a lot of redirection.     Language no aphasia.     Comprehension follows a couple of simple commands.   Cranial nerves:   CN2: appears to blink to  Dr Davies, Wagner Ibanez MD

## 2025-02-20 NOTE — PLAN OF CARE
Problem: Discharge Planning  Goal: Discharge to home or other facility with appropriate resources  Outcome: Progressing  Flowsheets (Taken 2/20/2025 0800)  Discharge to home or other facility with appropriate resources:   Identify barriers to discharge with patient and caregiver   Arrange for needed discharge resources and transportation as appropriate   Identify discharge learning needs (meds, wound care, etc)   Refer to discharge planning if patient needs post-hospital services based on physician order or complex needs related to functional status, cognitive ability or social support system     Problem: Pain  Goal: Verbalizes/displays adequate comfort level or baseline comfort level  Outcome: Progressing  Flowsheets (Taken 2/20/2025 0800)  Verbalizes/displays adequate comfort level or baseline comfort level:   Encourage patient to monitor pain and request assistance   Assess pain using appropriate pain scale   Administer analgesics based on type and severity of pain and evaluate response   Implement non-pharmacological measures as appropriate and evaluate response   Consider cultural and social influences on pain and pain management   Notify Licensed Independent Practitioner if interventions unsuccessful or patient reports new pain     Problem: Skin/Tissue Integrity  Goal: Skin integrity remains intact  Description: 1.  Monitor for areas of redness and/or skin breakdown  2.  Assess vascular access sites hourly  3.  Every 4-6 hours minimum:  Change oxygen saturation probe site  4.  Every 4-6 hours:  If on nasal continuous positive airway pressure, respiratory therapy assess nares and determine need for appliance change or resting period  Outcome: Progressing  Flowsheets  Taken 2/20/2025 1555  Skin Integrity Remains Intact:   Monitor for areas of redness and/or skin breakdown   Assess vascular access sites hourly  Taken 2/20/2025 0800  Skin Integrity Remains Intact:   Assess vascular access sites hourly   Monitor  for areas of redness and/or skin breakdown   Every 4-6 hours minimum: Change oxygen saturation probe site     Problem: Safety - Adult  Goal: Free from fall injury  Outcome: Progressing  Flowsheets (Taken 2/20/2025 1555)  Free From Fall Injury: Instruct family/caregiver on patient safety     Problem: Chronic Conditions and Co-morbidities  Goal: Patient's chronic conditions and co-morbidity symptoms are monitored and maintained or improved  Outcome: Progressing  Flowsheets (Taken 2/20/2025 0800)  Care Plan - Patient's Chronic Conditions and Co-Morbidity Symptoms are Monitored and Maintained or Improved:   Monitor and assess patient's chronic conditions and comorbid symptoms for stability, deterioration, or improvement   Collaborate with multidisciplinary team to address chronic and comorbid conditions and prevent exacerbation or deterioration   Update acute care plan with appropriate goals if chronic or comorbid symptoms are exacerbated and prevent overall improvement and discharge     Problem: ABCDS Injury Assessment  Goal: Absence of physical injury  Outcome: Progressing  Flowsheets (Taken 2/20/2025 1555)  Absence of Physical Injury: Implement safety measures based on patient assessment

## 2025-02-20 NOTE — PROGRESS NOTES
4 Eyes Skin Assessment     NAME:  Francisco Espinoza  YOB: 1970  MEDICAL RECORD NUMBER:  [id number]    The patient is being assessed for  Admission    I agree that at least one RN has performed a thorough Head to Toe Skin Assessment on the patient. ALL assessment sites listed below have been assessed.      Areas assessed by both nurses:    Head, Face, Ears, Shoulders, Back, Chest, Arms, Elbows, Hands, Sacrum. Buttock, Coccyx, Ischium, Legs. Feet and Heels, and Under Medical Devices         Does the Patient have a Wound? No noted wound(s)       Damon Prevention initiated by RN: Yes  Wound Care Orders initiated by RN: No    Pressure Injury (Stage 3,4, Unstageable, DTI, NWPT, and Complex wounds) if present, place Wound referral order by RN under : No    New Ostomies, if present place, Ostomy referral order under : No     Nurse 1 eSignature: Electronically signed by Kathy Grayson RN on 2/19/25 at 7:01 PM EST    **SHARE this note so that the co-signing nurse can place an eSignature**    Nurse 2 eSignature: Electronically signed by Anali Scott RN on 2/19/25 at 7:02 PM EST

## 2025-02-20 NOTE — PROGRESS NOTES
Patient reporting that he has chest discomfort, EKG ordered and performed per protocol. Dr. Bustos notified, no new orders obtained. Patient reports that he has had this chest pain before.

## 2025-02-20 NOTE — PROGRESS NOTES
NAME:  Francisco Espinoza  YOB: 1970  MEDICAL RECORD NUMBER:  [id number]    Shift Summary: Patient admitted to ICU at end of shift, on D5 infusion for Na. BMP ordered q2hr.     Family updated: No    Rhythm: Normal Sinus Rhythm     Most recent vitals:   Visit Vitals  BP (!) 88/70   Pulse 87   Temp (!) 100.7 °F (38.2 °C) (Axillary)   Resp 19   Ht 1.753 m (5' 9\")   Wt 62.5 kg (137 lb 12.6 oz)   SpO2 99%   BMI 20.35 kg/m²           No data found.    No data found.      Respiratory support needed (if any):  - RA    Admission weight Weight - Scale: 63.8 kg (140 lb 10.5 oz) (02/19/25 0811)    Today's weight    Wt Readings from Last 1 Encounters:   02/19/25 62.5 kg (137 lb 12.6 oz)        Elizabeth need assessed each shift: YES -  - continue elizabeth r/t - accurate I&O  UOP >30ml/hr: YES  Last documented BM (in last 48 hrs):  No data found.             Restraints (in use currently or dc'd in last 12 hrs): No    Order current and documentation up to date? N/a    Lines/Drains reviewed @ bedside.  Peripheral IV 02/19/25 Posterior;Right Hand (Active)   Number of days: 0       Peripheral IV 02/19/25 Right;Upper Arm (Active)   Number of days: 0       Urinary Catheter 02/19/25 (Active)   Number of days: 0         Drip rates at handoff:    dextrose 100 mL/hr at 02/19/25 1815    sodium chloride         Lab Data:   CBC:   Recent Labs     02/19/25  0902   WBC 5.2   HGB 11.4*   HCT 34.3*   MCV 90.5        BMP:    Recent Labs     02/19/25  1311 02/19/25  1649   * 134*   K 4.0 4.0   CO2 22 21   BUN 9 8   CREATININE 0.9 0.8*     LIVR: No results for input(s): \"AST\", \"ALT\" in the last 72 hours.  PT/INR: No results for input(s): \"INR\" in the last 72 hours.    Invalid input(s): \"PROT\"  APTT: No results for input(s): \"APTT\" in the last 72 hours.  ABG: No results for input(s): \"PHART\", \"YDP3VZF\", \"PO2ART\" in the last 72 hours.    Any consults during the shift? No    Any signed and held orders to be released?  No        4 Eyes

## 2025-02-20 NOTE — PROGRESS NOTES
Nephrology Progress Note   [email or web address]      Reason for consultation: Hyponatremia     History of Present Illness: Francisco Espinoza is a 55 yo male with a PMHx of epilepsy, alcoholism, substance abuse,TBI, cerebral palsy. Patient presents to  ED on 2025 with seizure activity. Patient seized again in the ED. He received IV ativan here and is currently post-ictal. Patient is unable to participate in history obtaining. Apparently patient had been drinking in excess for several days and not taking his medication. Na+ upon arrival is 121 mmol/L. SBP is soft and in the 90s upon exam.      Subjective:       Patient seen and examined. Labs and chart reviewed. Seen in the ED on RA. No family at bedside.     Subjective:    The patient has been seen and examined. Labs and chart reviewed. Resting in bed on RA. Will not answer many questions but this seems purposeful and not related to his mentation. Na+ overcorrected yesterday and is resistant to come back down. On D5W @ 150 mL/hr.    There were not complications overnight.    Patient review of systems: Endorses SOB       Allergies:  No Known Allergies     Scheduled Meds:   [START ON 2025] vitamin D  50,000 Units Oral Weekly    famotidine  20 mg Oral BID    lacosamide  100 mg Oral BID    traZODone  50 mg Oral Nightly    zonisamide  400 mg Oral Nightly    sodium chloride flush  5-40 mL IntraVENous 2 times per day    enoxaparin  40 mg SubCUTAneous Daily        dextrose 150 mL/hr at 25 0543    sodium chloride         PRN Meds:methocarbamol, sodium chloride flush, sodium chloride, potassium chloride **OR** potassium alternative oral replacement **OR** potassium chloride, magnesium sulfate, ondansetron **OR** ondansetron, polyethylene glycol, acetaminophen **OR** acetaminophen, LORazepam    Physical Exam:    TEMPERATURE:  Current - Temp: 100.1 °F (37.8 °C); Max - Temp  Av °F (37.2 °C)  Min: 97.7 °F (36.5 °C)  Max: 100.7 °F (38.2 °C)  RESPIRATIONS RANGE: Resp   WBCUA 1 08/18/2024 09:26 PM    RBCUA 0 08/18/2024 09:26 PM    BACTERIA None Seen 08/18/2024 09:26 PM    CLARITYU Clear 02/19/2025 11:35 AM    LEUKOCYTESUR Negative 02/19/2025 11:35 AM    UROBILINOGEN 0.2 02/19/2025 11:35 AM    BILIRUBINUR Negative 02/19/2025 11:35 AM    BLOODU Negative 02/19/2025 11:35 AM    GLUCOSEU Negative 02/19/2025 11:35 AM         IMPRESSION/RECOMMENDATIONS:      Hyponatremia: acute etiology likely 2/2 volume depletion.              - Na+ 121 mmol/L upon admission. Overcorrected s/p 3% saline bolus and 500 mL NS bolus. Last Na+ check is 133 mmol/L.               - Urine Na < 20. Urine osm 117.              - Maintain elizabeth catheter              - Increase D5W to 200 mL/hr.    - Continue q2 Na+ checks   - Goal Na+ 127-129 mmol/L this AM     Epilepsy with active seizure: etiology likely multifactorial and 2/2 hyponatremia, antiepileptic non-compliance, and possible alcohol withdrawal              - Hyponatremia tx per above              - Further management per primary      Metabolic acidosis              - Resolved     Alcohol abuse              - Management per primary     Hypotension              - IVF per above              - Start midodrine 2.5 mg TID      H/o TBI     Anemia               - B12, folate, iron adequate    Will discuss with nephrology attending physician, Dr. Her.  See attestation for additional recommendations.    OBDULIA Constantino - CNP

## 2025-02-20 NOTE — PROGRESS NOTES
Contacted lab to follow up on BMP seen being drawn at approximately 1401 but not resulted. Per lab the tech was unable to draw the lab, however, RN was not notified. RN request lab to make staff aware should the sample be unable to be obtained. MD updated and will evaluate with next panel to be drawn at 1600.

## 2025-02-20 NOTE — PROGRESS NOTES
chart review. Difficulty assessing cognition d/t pt very reserved, flat affect.    Objective          Gross Assessment  AROM: Generally decreased, functional  Strength: Generally decreased, functional  Sensation: Impaired (Diminished hands/feet.)                      Bed mobility  Supine to Sit: Stand by assistance (HOB elevated.)  Transfers  Sit to Stand: Stand by assistance  Stand to Sit: Stand by assistance  Comment: Transfers from eob, recliner.  Ambulation  Surface: Level tile  Distance: Pt amb few steps eob to recliner, short distances within hospital room setting with Slight CGA.  Diminished step length/clearance, mild lat sway.  No specific LOB.     Balance  Comments: Pt stood at sink for self care SBA/Supervision.            AM-PAC - Mobility    AM-PAC Basic Mobility - Inpatient   How much help is needed turning from your back to your side while in a flat bed without using bedrails?: None  How much help is needed moving from lying on your back to sitting on the side of a flat bed without using bedrails?: None  How much help is needed moving to and from a bed to a chair?: A Little  How much help is needed standing up from a chair using your arms?: A Little  How much help is needed walking in hospital room?: A Little  How much help is needed climbing 3-5 steps with a railing?: A Little  AM-[facility] Inpatient Mobility Raw Score : 20  AM-PAC Inpatient T-Scale Score : 47.67  Mobility Inpatient CMS 0-100% Score: 35.83  Mobility Inpatient CMS G-Code Modifier : CJ         Goals  Short Term Goals  Time Frame for Short Term Goals: Upon d/c acute care setting.  Short Term Goal 1: Bed Mob Supervision.  Short Term Goal 2: Transfers Supervision.  Short Term Goal 3: Amb 100-150' SBA/Supervision.  Short Term Goal 4: Stair Negotiation as approp.  Patient Goals   Patient Goals : Go home.       Education  Patient Education  Education Given To: Patient  Education Provided Comments: Role of PT, POC, Need to call for  assist.  Education Method: Verbal  Barriers to Learning: Cognition (Insight.)  Education Outcome: Verbalized understanding;Continued education needed      Therapy Time   Individual Concurrent Group Co-treatment   Time In 1000         Time Out 1030         Minutes 30                 Cyndie Ku, PT

## 2025-02-20 NOTE — PROGRESS NOTES
Patient refuses to communicate or answer admission questions. Patient nods and gives hand signals, or mumbles.  Patient was clear in a few cuss words during medication administration, but patient did comply with taking evening medications.  Patient was refusing lab draw at first, but did eventually allow it after this nurse explained his current lab results and the possible poor outcomes.

## 2025-02-20 NOTE — PROGRESS NOTES
NAME:  Francisco Espinoza  YOB: 1970  MEDICAL RECORD NUMBER:  [id number]    Shift Summary: Patient slept through the night.  Urine output is good. Increased D5W to 150 ml/hr.  BMP recheck at 0800, notify nephrology with Sodium level    Family updated: No    Rhythm: Normal Sinus Rhythm     Most recent vitals:   Visit Vitals  /78   Pulse 77   Temp 97.7 °F (36.5 °C) (Axillary)   Resp 17   Ht 1.753 m (5' 9\")   Wt 62.5 kg (137 lb 12.6 oz)   SpO2 98%   BMI 20.35 kg/m²           No data found.    No data found.      Respiratory support needed (if any):  - RA    Admission weight Weight - Scale: 63.8 kg (140 lb 10.5 oz) (02/19/25 0811)    Today's weight    Wt Readings from Last 1 Encounters:   02/19/25 62.5 kg (137 lb 12.6 oz)        Elizabeth need assessed each shift: YES -  - continue elizabeth r/t - Urine retention  UOP >30ml/hr: YES  Last documented BM (in last 48 hrs):  No data found.             Restraints (in use currently or dc'd in last 12 hrs): No    Order current and documentation up to date? No    Lines/Drains reviewed @ bedside.  Peripheral IV 02/19/25 Posterior;Right Hand (Active)   Number of days: 0       Peripheral IV 02/19/25 Right;Upper Arm (Active)   Number of days: 0       Urinary Catheter 02/19/25 (Active)   Number of days: 0         Drip rates at handoff:    dextrose 150 mL/hr at 02/20/25 0543    sodium chloride         Lab Data:   CBC:   Recent Labs     02/19/25  0902 02/20/25  0415   WBC 5.2 4.9   HGB 11.4* 12.4*   HCT 34.3* 37.2*   MCV 90.5 91.1    225     BMP:    Recent Labs     02/20/25  0104 02/20/25  0415   * 134*  135*   K 4.0 3.9  3.9   CO2 23 23  22   BUN 8 8  8   CREATININE 0.9 0.9  0.9     LIVR:   Recent Labs     02/20/25  0415   AST 25   ALT 26     PT/INR: No results for input(s): \"INR\" in the last 72 hours.    Invalid input(s): \"PROT\"  APTT: No results for input(s): \"APTT\" in the last 72 hours.  ABG: No results for input(s): \"PHART\", \"PGN9TES\", \"PO2ART\" in the  last 72 hours.    Any consults during the shift? No    Any signed and held orders to be released?  No        4 Eyes Skin Assessment       The patient is being assessed for  Shift Handoff    I agree that at least one RN has performed a thorough Head to Toe Skin Assessment on the patient. ALL assessment sites listed below have been assessed.      Areas assessed by both nurses: Head, Face, Ears, Shoulders, Back, Chest, Arms, Elbows, Hands, Sacrum. Buttock, Coccyx, Ischium, Legs. Feet and Heels, and Under Medical Devices         Does the Patient have a Wound? No noted wound(s)    Wound Care Orders initiated by RN: No       Damon Prevention initiated by RN: No    Pressure Injury (Stage 3,4, Unstageable, DTI, NWPT, and Complex wounds) if present, place Wound referral order by RN under : No    New Ostomies, if present place, Ostomy referral order under : No     Nurse 1 eSignature: Electronically signed by PRABHJOT LIU RN on 2/20/25 at 6:31 AM EST    **SHARE this note so that the co-signing nurse can place an eSignature**    Nurse 2 eSignature: Electronically signed by Emi Patel RN on 2/20/25 at 7:40 AM EST

## 2025-02-20 NOTE — PROGRESS NOTES
Patient's sodium level as remained corrected too high.  Spoke with Tracie Guadarrama MD with Nephrology.  Order to increase D5W to 150 ml/hr and recheck BMP in 2 hours. Order placed, increased rate on pump.

## 2025-02-20 NOTE — PLAN OF CARE
Problem: Discharge Planning  Goal: Discharge to home or other facility with appropriate resources  Outcome: Progressing     Problem: Pain  Goal: Verbalizes/displays adequate comfort level or baseline comfort level  Outcome: Progressing     Problem: Skin/Tissue Integrity  Goal: Skin integrity remains intact  Description: 1.  Monitor for areas of redness and/or skin breakdown  2.  Assess vascular access sites hourly  3.  Every 4-6 hours minimum:  Change oxygen saturation probe site  4.  Every 4-6 hours:  If on nasal continuous positive airway pressure, respiratory therapy assess nares and determine need for appliance change or resting period  Outcome: Progressing     Problem: Safety - Adult  Goal: Free from fall injury  Outcome: Progressing     Problem: Chronic Conditions and Co-morbidities  Goal: Patient's chronic conditions and co-morbidity symptoms are monitored and maintained or improved  Outcome: Progressing

## 2025-02-21 LAB
ANION GAP SERPL CALCULATED.3IONS-SCNC: 10 MMOL/L (ref 3–16)
ANION GAP SERPL CALCULATED.3IONS-SCNC: 10 MMOL/L (ref 3–16)
ANION GAP SERPL CALCULATED.3IONS-SCNC: 12 MMOL/L (ref 3–16)
BUN SERPL-MCNC: 6 MG/DL (ref 7–20)
BUN SERPL-MCNC: 7 MG/DL (ref 7–20)
BUN SERPL-MCNC: 7 MG/DL (ref 7–20)
CALCIUM SERPL-MCNC: 8.7 MG/DL (ref 8.3–10.6)
CALCIUM SERPL-MCNC: 8.7 MG/DL (ref 8.3–10.6)
CALCIUM SERPL-MCNC: 9.3 MG/DL (ref 8.3–10.6)
CHLORIDE SERPL-SCNC: 102 MMOL/L (ref 99–110)
CHLORIDE SERPL-SCNC: 104 MMOL/L (ref 99–110)
CHLORIDE SERPL-SCNC: 91 MMOL/L (ref 99–110)
CO2 SERPL-SCNC: 18 MMOL/L (ref 21–32)
CO2 SERPL-SCNC: 21 MMOL/L (ref 21–32)
CO2 SERPL-SCNC: 22 MMOL/L (ref 21–32)
CREAT SERPL-MCNC: 0.8 MG/DL (ref 0.9–1.3)
CREAT SERPL-MCNC: 0.9 MG/DL (ref 0.9–1.3)
CREAT SERPL-MCNC: 0.9 MG/DL (ref 0.9–1.3)
DEPRECATED RDW RBC AUTO: 12.6 % (ref 12.4–15.4)
EKG ATRIAL RATE: 90 BPM
EKG DIAGNOSIS: NORMAL
EKG P AXIS: 43 DEGREES
EKG P-R INTERVAL: 140 MS
EKG Q-T INTERVAL: 364 MS
EKG QRS DURATION: 92 MS
EKG QTC CALCULATION (BAZETT): 445 MS
EKG R AXIS: -30 DEGREES
EKG T AXIS: 25 DEGREES
EKG VENTRICULAR RATE: 90 BPM
GFR SERPLBLD CREATININE-BSD FMLA CKD-EPI: >90 ML/MIN/{1.73_M2}
GLUCOSE SERPL-MCNC: 102 MG/DL (ref 70–99)
GLUCOSE SERPL-MCNC: 105 MG/DL (ref 70–99)
GLUCOSE SERPL-MCNC: 107 MG/DL (ref 70–99)
HCT VFR BLD AUTO: 35.7 % (ref 40.5–52.5)
HGB BLD-MCNC: 11.8 G/DL (ref 13.5–17.5)
LACOSAMIDE SERPL-MCNC: <0.5 UG/ML (ref 1–10)
MCH RBC QN AUTO: 30.1 PG (ref 26–34)
MCHC RBC AUTO-ENTMCNC: 33.2 G/DL (ref 31–36)
MCV RBC AUTO: 90.6 FL (ref 80–100)
PLATELET # BLD AUTO: 231 K/UL (ref 135–450)
PMV BLD AUTO: 8.4 FL (ref 5–10.5)
POTASSIUM SERPL-SCNC: 3.7 MMOL/L (ref 3.5–5.1)
POTASSIUM SERPL-SCNC: 3.9 MMOL/L (ref 3.5–5.1)
POTASSIUM SERPL-SCNC: 4.1 MMOL/L (ref 3.5–5.1)
RBC # BLD AUTO: 3.94 M/UL (ref 4.2–5.9)
SODIUM SERPL-SCNC: 121 MMOL/L (ref 136–145)
SODIUM SERPL-SCNC: 133 MMOL/L (ref 136–145)
SODIUM SERPL-SCNC: 136 MMOL/L (ref 136–145)
WBC # BLD AUTO: 5.2 K/UL (ref 4–11)
ZONISAMIDE SERPL-MCNC: 7 UG/ML (ref 10–40)

## 2025-02-21 PROCEDURE — 94760 N-INVAS EAR/PLS OXIMETRY 1: CPT

## 2025-02-21 PROCEDURE — 97530 THERAPEUTIC ACTIVITIES: CPT

## 2025-02-21 PROCEDURE — 92610 EVALUATE SWALLOWING FUNCTION: CPT

## 2025-02-21 PROCEDURE — 2500000003 HC RX 250 WO HCPCS: Performed by: INTERNAL MEDICINE

## 2025-02-21 PROCEDURE — 2580000003 HC RX 258: Performed by: HOSPITALIST

## 2025-02-21 PROCEDURE — 85027 COMPLETE CBC AUTOMATED: CPT

## 2025-02-21 PROCEDURE — 6370000000 HC RX 637 (ALT 250 FOR IP)

## 2025-02-21 PROCEDURE — 80048 BASIC METABOLIC PNL TOTAL CA: CPT

## 2025-02-21 PROCEDURE — 2060000000 HC ICU INTERMEDIATE R&B

## 2025-02-21 PROCEDURE — 36415 COLL VENOUS BLD VENIPUNCTURE: CPT

## 2025-02-21 PROCEDURE — 93010 ELECTROCARDIOGRAM REPORT: CPT | Performed by: INTERNAL MEDICINE

## 2025-02-21 PROCEDURE — 6360000002 HC RX W HCPCS: Performed by: INTERNAL MEDICINE

## 2025-02-21 PROCEDURE — 6370000000 HC RX 637 (ALT 250 FOR IP): Performed by: INTERNAL MEDICINE

## 2025-02-21 RX ORDER — 0.9 % SODIUM CHLORIDE 0.9 %
500 INTRAVENOUS SOLUTION INTRAVENOUS ONCE
Status: COMPLETED | OUTPATIENT
Start: 2025-02-21 | End: 2025-02-21

## 2025-02-21 RX ADMIN — SODIUM CHLORIDE, PRESERVATIVE FREE 10 ML: 5 INJECTION INTRAVENOUS at 20:18

## 2025-02-21 RX ADMIN — ZONISAMIDE 400 MG: 100 CAPSULE ORAL at 20:14

## 2025-02-21 RX ADMIN — FAMOTIDINE 20 MG: 20 TABLET, FILM COATED ORAL at 08:30

## 2025-02-21 RX ADMIN — ERGOCALCIFEROL 50000 UNITS: 1.25 CAPSULE ORAL at 08:30

## 2025-02-21 RX ADMIN — FAMOTIDINE 20 MG: 20 TABLET, FILM COATED ORAL at 20:14

## 2025-02-21 RX ADMIN — SODIUM CHLORIDE 500 ML: 9 INJECTION, SOLUTION INTRAVENOUS at 03:26

## 2025-02-21 RX ADMIN — ONDANSETRON 4 MG: 2 INJECTION, SOLUTION INTRAMUSCULAR; INTRAVENOUS at 03:29

## 2025-02-21 RX ADMIN — MIDODRINE HYDROCHLORIDE 2.5 MG: 5 TABLET ORAL at 17:32

## 2025-02-21 RX ADMIN — MIDODRINE HYDROCHLORIDE 2.5 MG: 5 TABLET ORAL at 12:38

## 2025-02-21 RX ADMIN — TRAZODONE HYDROCHLORIDE 50 MG: 50 TABLET ORAL at 20:14

## 2025-02-21 RX ADMIN — LACOSAMIDE 100 MG: 100 TABLET, FILM COATED ORAL at 20:14

## 2025-02-21 RX ADMIN — ACETAMINOPHEN 650 MG: 325 TABLET ORAL at 23:28

## 2025-02-21 RX ADMIN — ACETAMINOPHEN 650 MG: 650 SUPPOSITORY RECTAL at 04:27

## 2025-02-21 RX ADMIN — LACOSAMIDE 100 MG: 100 TABLET, FILM COATED ORAL at 08:30

## 2025-02-21 RX ADMIN — ENOXAPARIN SODIUM 40 MG: 100 INJECTION SUBCUTANEOUS at 08:29

## 2025-02-21 RX ADMIN — MIDODRINE HYDROCHLORIDE 2.5 MG: 5 TABLET ORAL at 08:30

## 2025-02-21 RX ADMIN — SODIUM CHLORIDE, PRESERVATIVE FREE 10 ML: 5 INJECTION INTRAVENOUS at 08:30

## 2025-02-21 ASSESSMENT — PAIN DESCRIPTION - ORIENTATION: ORIENTATION: MID

## 2025-02-21 ASSESSMENT — PAIN SCALES - GENERAL
PAINLEVEL_OUTOF10: 3
PAINLEVEL_OUTOF10: 0

## 2025-02-21 ASSESSMENT — PAIN SCALES - WONG BAKER: WONGBAKER_NUMERICALRESPONSE: NO HURT

## 2025-02-21 ASSESSMENT — PAIN - FUNCTIONAL ASSESSMENT: PAIN_FUNCTIONAL_ASSESSMENT: PREVENTS OR INTERFERES SOME ACTIVE ACTIVITIES AND ADLS

## 2025-02-21 NOTE — PLAN OF CARE
Problem: Discharge Planning  Goal: Discharge to home or other facility with appropriate resources  2/21/2025 1031 by Gwen Valle RN  Outcome: Progressing     Problem: Pain  Goal: Verbalizes/displays adequate comfort level or baseline comfort level  2/21/2025 1031 by Gwen Valle RN  Outcome: Progressing     Problem: Skin/Tissue Integrity  Goal: Skin integrity remains intact  Description: 1.  Monitor for areas of redness and/or skin breakdown  2.  Assess vascular access sites hourly  3.  Every 4-6 hours minimum:  Change oxygen saturation probe site  4.  Every 4-6 hours:  If on nasal continuous positive airway pressure, respiratory therapy assess nares and determine need for appliance change or resting period  2/21/2025 1031 by Gwen Valle RN  Outcome: Progressing     Problem: Safety - Adult  Goal: Free from fall injury  2/21/2025 1031 by Gwen Valle RN  Outcome: Progressing     Problem: Chronic Conditions and Co-morbidities  Goal: Patient's chronic conditions and co-morbidity symptoms are monitored and maintained or improved  2/21/2025 1031 by Gwen Valle RN  Outcome: Progressing     Problem: ABCDS Injury Assessment  Goal: Absence of physical injury  2/21/2025 1031 by Gwen Valle RN  Outcome: Progressing

## 2025-02-21 NOTE — PROGRESS NOTES
V2.0    [facility] [place] Progress Note      Name:  Francisco Espinoza /Age/Sex: 1970  (54 y.o. male)   MRN & CSN:  [id number] & [id number] Encounter Date/Time: 2025 8:57 AM EDT   Location:  C5I-3783/5259-01 PCP: No primary care provider on file.     Attending:Wagner Davies MD       Hospital Day: 3      HPI :       Subjective:     Patient feels better today.  No seizure activity since admission, no nausea, vomiting or diarrhea    Review of Systems:      Pertinent positives and negatives discussed in HPI    Objective:     Intake/Output Summary (Last 24 hours) at 2025 1400  Last data filed at 2025 1133  Gross per 24 hour   Intake 2755.95 ml   Output 4200 ml   Net -1444.05 ml      Vitals:   Vitals:    25 0331 25 0442 25 0657 25 1130   BP: 106/78  114/68 96/63   Pulse: 86  75    Resp: 18  19 16   Temp:   97.2 °F (36.2 °C) 98.2 °F (36.8 °C)   TempSrc: Oral  Oral Oral   SpO2: 99%  98% 98%   Weight:  64 kg (141 lb 1.5 oz)     Height:             Physical Exam:      Physical Exam Performed:    BP 96/63   Pulse 75   Temp 98.2 °F (36.8 °C) (Oral)   Resp 16   Ht 1.753 m (5' 9\")   Wt 64 kg (141 lb 1.5 oz)   SpO2 98%   BMI 20.84 kg/m²     General appearance: No apparent distress, appears stated age and cooperative.  HEENT: Pupils equal, round, and reactive to light. Conjunctivae/corneas clear.  Neck: Supple, with full range of motion. No jugular venous distention. Trachea midline.  Respiratory:  Normal respiratory effort. Clear to auscultation, bilaterally without Rales/Wheezes/Rhonchi.  Cardiovascular: Regular rate and rhythm with normal S1/S2 without murmurs, rubs or gallops.  Abdomen: Soft, non-tender, non-distended with normal bowel sounds.  Musculoskeletal: No clubbing, cyanosis or edema bilaterally.  Full range of motion without deformity.  Neurologic:  Neurovascularly intact without any focal sensory/motor deficits. Cranial nerves: II-XII intact, grossly non-focal.  Psychiatric:

## 2025-02-21 NOTE — PROGRESS NOTES
Occupational Therapy  Facility/Department: [address] PROGRESSIVE CARE  Occupational Therapy Daily Treatment    Name: Francisco Espinoza  : 1970  MRN: [id number]  Date of Service: 2025    Discharge Recommendations:  Home with assist PRN, Patient would benefit from continued therapy after discharge, 2-3 sessions per week  OT Equipment Recommendations  Other: shower chair - cont to assess.  Francisco Espinoza scored a 21/24 on the AM-PAC ADL Inpatient form. Current research shows that an AM-PAC score of 18 or greater is typically associated with a discharge to the patient's home setting. Based on the patient's AM-PAC score, and their current ADL deficits, it is recommended that the patient have 2-3 sessions per week of Occupational Therapy at d/c to increase the patient's independence.  At this time, this patient demonstrates the endurance and safety to discharge home with HHOT and a follow up treatment frequency of 2-3x/wk.   Please see assessment section for further patient specific details.    If patient discharges prior to next session this note will serve as a discharge summary.  Please see below for the latest assessment towards goals.       Patient Diagnosis(es): The primary encounter diagnosis was Seizures ([facility]). Diagnoses of Hyponatremia and Post-ictal state (HCC) were also pertinent to this visit.  Past Medical History:  has a past medical history of Acute alcoholism (HCC), Anemia, unspecified, Anxiety disorder, Avitaminosis D, Cerebral palsy (HCC), Epilepsy with altered consciousness with intractable epilepsy (HCC), Insomnia, unspecified, Neurocognitive disorder, Noncompliance with medication regimen, Psychoactive substance abuse (HCC), Simple schizophrenia, subchronic condition (HCC), and TBI (traumatic brain injury) ([facility]).  Past Surgical History:  has no past surgical history on file.    Treatment Diagnosis: impaired balance, activity tolerance, ADL/IADLs, mobility/txs      Assessment  Performance deficits

## 2025-02-21 NOTE — CARE COORDINATION
Case Management Assessment  Initial Evaluation    Date/Time of Evaluation: 2/21/2025 12:07 PM  Assessment Completed by: Codi Espinal RN    If patient is discharged prior to next notation, then this note serves as note for discharge by case management.    Patient Name: Francisco Espinoza                   YOB: 1970  Diagnosis: Hyponatremia [E87.1]  Seizure (HCC) [R56.9]  Seizures (HCC) [R56.9]  Post-ictal state (HCC) [R56.9]                   Date / Time: 2/19/2025  8:07 AM    Patient Admission Status: Inpatient   Readmission Risk (Low < 19, Mod (19-27), High > 27): Readmission Risk Score: 10.2    Current PCP: No primary care provider on file.  PCP verified by CM? No (no PCP)    Chart Reviewed: Yes      History Provided by: Patient  Patient Orientation: Alert and Oriented    Patient Cognition: Alert    Hospitalization in the last 30 days (Readmission):  No    If yes, Readmission Assessment in CM Navigator will be completed.    Advance Directives:      Code Status: Full Code   Patient's Primary Decision Maker is: Legal Next of Kin    Primary Decision Maker: Sara Espinoza - Parent - [phone]    Secondary Decision Maker: FredyErendira - Brother/Sister - [phone]    Discharge Planning:    Patient lives with: Parent Type of Home: House (10 steps with bilateral railing)  Primary Care Giver: Self  Patient Support Systems include: Parent, Family Members   Current Financial resources: Medicaid  Current community resources: None  Current services prior to admission: None            Current DME:              Type of Home Care services:  None    ADLS  Prior functional level: Independent in ADLs/IADLs  Current functional level: Assistance with the following:, Mobility, Toileting    PT AM-PAC: 20 /24  OT AM-PAC: 21 /24    Family can provide assistance at DC: Yes  Would you like Case Management to discuss the discharge plan with any other family members/significant others, and if so, who? No  Plans to Return to  Present Housing: Yes  Other Identified Issues/Barriers to RETURNING to current housing: NA  Potential Assistance needed at discharge: N/A            Potential DME:    Patient expects to discharge to: House  Plan for transportation at discharge: Family    Financial    Payor: [employer] [place] / Plan: [employer] [place] / Product Type: *No Product type* /     Does insurance require precert for SNF: Yes    Potential assistance Purchasing Medications: No  Meds-to-Beds request:      No Pharmacies Listed    Notes:    Factors facilitating achievement of predicted outcomes: Family support and Cooperative    Barriers to discharge: NA    Additional Case Management Notes: Plan is to return home with family support. No needs. Pt states family will transport him or he can make his own lyft ride home.     The Plan for Transition of Care is related to the following treatment goals of Hyponatremia [E87.1]  Seizure (HCC) [R56.9]  Seizures (HCC) [R56.9]  Post-ictal state (HCC) [R56.9]    Codi Espinal RN  Case Management Department  Ph: [phone]

## 2025-02-21 NOTE — PROGRESS NOTES
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   has no past surgical history on file.  Allergies: NKA  Dysphagia History including instrumental assessment: 2025 CSE (OSH) rec for regular/thin  GI history: prior GI bleeds, gastritis, esophagitis  Baseline/Prior Level of Function: Living Status: admitted from [facility]; Baseline diet: regular/thin    Onset Date: 2025        Reason for referral: SLP evaluation orders received due to coughing with pills with water                         CURRENT ENCOUNTER DIAGNOSTICS/COURSE OF ADMISSION     2025 admitted with c/o seizures. MD ADMISSION H&P HPI: \"54 y.o. male who presented to [facility] with seizure activity, had another episode in emergency department continue to be confused thereafter, found to have hyponatremia nephrology consulted.  To note the patient apparently was admitted recently to   for multiple breakthrough seizures, at that time discharged on Vimpat and Zonegran, discussed with neurology who is okay to be admitted at [facility] patient will be admitted to ICU discussed with EDMD\"   Consults noted: Neurology, Nephrology    Most Recent Imagin2025 CXR: IMPRESSION: Patchy airspace and interstitial opacities. Findings may represent pulmonary edema or multifocal pneumonia.  An  disinterest > mechanical deficit and no overt signs/symptoms of penetration/aspiration. Although, adequate, current presentation does altered from 2/15/2025 notes; rec follow-up to confirm current recommendations.  Recommend resume regular diet texture with thin liquids; meds PO as tolerated/preferred.  ST to follow-up 1-2 times unless otherwise noted.    MEDICAL OR COGNITIVE/BEHAVIORAL FACTORS WHICH CAN EXACERBATE: behavioral barriers  comorbidities    Dysphagia Prognosis: good, guarded  Barriers to progress: co-morbidities, compliance/behavioral, esophageal involvements    RECOMMENDATIONS     Recommended Diet and Intervention 2/21/2025:  Diet Solids Recommendation:  Regular texture Liquid Consistency Recommendation:  Thin liquids   Recommended form of Meds: PO per patient preference     Compensatory Swallowing Strategies:  Upright as possible with all PO intake , Remain upright 30-45 min     Eating Assistance Recommendation: Setup or clean-up assistance    Discharge Recommendations: Do not anticipate need for further speech/dysphagia therapy upon discharge from hospital     GOALS / PLAN     PLAN OF CARE: Speech therapy for dysphagia tx 1-3 times over admission    SHORT TERM DYSPHAGIA GOALS  Pt will functionally tolerate recommended diet with no overt clinical s/s of aspiration       Dysphagia Therapeutic Intervention:  Diet Tolerance Monitoring , Patient/Family Education     EDUCATION     Provided education regarding role of SLP, results of assessment, recommendations and general speech pathology plan of care.   Patient Response: Needs reinforcement  Family education: Family not present/unavailable  Staff education: RN verbalizes understanding; MD messaged    DATA     Respiratory Status: Room Air     Pain: Did not state       Vision: Adequate for assessment/tx needs  Hearing: Adequate for assessment/tx needs    Cognitive/behavioral: oriented to self/place/situation, alert, self-limiting, limited verbal responses,

## 2025-02-21 NOTE — PROGRESS NOTES
Pt getting sodium level checked q6. Sodium was at the goal of 128 at 1541. Pts sodium dropped to 123 at 1928. On call nephrologist notified. 250mL bolus ordered. DO stated to notify if next lab draw had another drop in sodium or exceeded 129.

## 2025-02-21 NOTE — ACP (ADVANCE CARE PLANNING)
Advance Care Planning   Healthcare Decision Maker:    Primary Decision Maker: Portillo Espinozacandida - Parent - [phone]    Secondary Decision Maker: Erendira Daniel - Brother/Sister - [phone]    Today we documented Decision Maker(s) consistent with Legal Next of Kin hierarchy.     Electronically signed by Codi Espinal RN on 2/21/2025 at 12:01 PM

## 2025-02-21 NOTE — PROGRESS NOTES
Spiritual Health History and Assessment/Progress Note  [facility]    (P) Spiritual/Emotional Needs,  ,  ,      Name: Francisco Espinoza MRN: [id number]    Age: 54 y.o.     Sex: male   Language: English   Pentecostalism: Unknown   Seizure (HCC)     Date: 2/21/2025            Total Time Calculated: (P) 5 min              Spiritual Assessment began in [facility] PROGRESSIVE CARE        Referral/Consult From: (P) Nurse   Encounter Overview/Reason: (P) Spiritual/Emotional Needs  Service Provided For: (P) Patient    Liberty, Belief, Meaning:   Patient Other: Pt has liberty beliefs that support him, pt shared feelings of guild associated with his Jain beliefs.  Family/Friends No family/friends present      Importance and Influence:  Patient has spiritual/personal beliefs that influence decisions regarding their health  Family/Friends No family/friends present    Community:  Patient Other: Unknown  Family/Friends No family/friends present    Assessment and Plan of Care:   Patient Interventions include: Facilitated expression of thoughts and feelings, Explored spiritual coping/struggle/distress, and Other: Offered emotional/spiritual support to pt through pt shared and feelings towards Jain/liberty beliefs  Family/Friends Interventions include: No family/friends present    Patient Plan of Care: Other:  will follow up to offer emotional/spiritual support  Family/Friends Plan of Care: No family/friends present    Electronically signed by LUIS ANGEL Parada on 2/21/2025 at 10:55 AM

## 2025-02-21 NOTE — PROGRESS NOTES
Pts next sodium draw dropped again to 121. On call nephrologist notified. 500mL bolus ordered. Sodium changed to be checked Q4.

## 2025-02-21 NOTE — PROGRESS NOTES
Nephrology Progress Note   [facility]      Reason for consultation: Hyponatremia     History of Present Illness: Francisco Espnioza is a 55 yo male with a PMHx of epilepsy, alcoholism, substance abuse,TBI, cerebral palsy. Patient presents to  ED on 2025 with seizure activity. Patient seized again in the ED. He received IV ativan here and is currently post-ictal. Patient is unable to participate in history obtaining. Apparently patient had been drinking in excess for several days and not taking his medication. Na+ upon arrival is 121 mmol/L. SBP is soft and in the 90s upon exam.      Subjective:       Patient seen and examined. Labs and chart reviewed. Seen in the ED on RA. No family at bedside.     Subjective:    The patient has been seen and examined. Labs and chart reviewed. Transferred out of ICU. Resting in chair on RA. Will only nod yes or no  to questions and will not talk to me. Na+ dropped to 121 mmol/L last night. He was given a 500 mL NS bolus and Na+ came back up to 133 mmol/L.     There were not complications overnight.    Patient review of systems: denies SOB       Allergies:  No Known Allergies     Scheduled Meds:   midodrine  2.5 mg Oral TID WC    vitamin D  50,000 Units Oral Weekly    famotidine  20 mg Oral BID    lacosamide  100 mg Oral BID    traZODone  50 mg Oral Nightly    zonisamide  400 mg Oral Nightly    sodium chloride flush  5-40 mL IntraVENous 2 times per day    enoxaparin  40 mg SubCUTAneous Daily        sodium chloride         PRN Meds:nitroGLYCERIN, methocarbamol, sodium chloride flush, sodium chloride, potassium chloride **OR** potassium alternative oral replacement **OR** potassium chloride, magnesium sulfate, ondansetron **OR** ondansetron, polyethylene glycol, acetaminophen **OR** acetaminophen, LORazepam    Physical Exam:    TEMPERATURE:  Current - Temp: 97.2 °F (36.2 °C); Max - Temp  Av °F (36.7 °C)  Min: 97.2 °F (36.2 °C)  Max: 98.6 °F (37 °C)  RESPIRATIONS RANGE: Resp

## 2025-02-22 LAB
ANION GAP SERPL CALCULATED.3IONS-SCNC: 10 MMOL/L (ref 3–16)
BUN SERPL-MCNC: 12 MG/DL (ref 7–20)
CALCIUM SERPL-MCNC: 9.3 MG/DL (ref 8.3–10.6)
CHLORIDE SERPL-SCNC: 102 MMOL/L (ref 99–110)
CO2 SERPL-SCNC: 22 MMOL/L (ref 21–32)
CREAT SERPL-MCNC: 0.9 MG/DL (ref 0.9–1.3)
DEPRECATED RDW RBC AUTO: 13.1 % (ref 12.4–15.4)
GFR SERPLBLD CREATININE-BSD FMLA CKD-EPI: >90 ML/MIN/{1.73_M2}
GLUCOSE SERPL-MCNC: 100 MG/DL (ref 70–99)
HCT VFR BLD AUTO: 35.4 % (ref 40.5–52.5)
HGB BLD-MCNC: 11.9 G/DL (ref 13.5–17.5)
MCH RBC QN AUTO: 30.7 PG (ref 26–34)
MCHC RBC AUTO-ENTMCNC: 33.6 G/DL (ref 31–36)
MCV RBC AUTO: 91.4 FL (ref 80–100)
PLATELET # BLD AUTO: 230 K/UL (ref 135–450)
PMV BLD AUTO: 8.5 FL (ref 5–10.5)
POTASSIUM SERPL-SCNC: 4.1 MMOL/L (ref 3.5–5.1)
RBC # BLD AUTO: 3.88 M/UL (ref 4.2–5.9)
SODIUM SERPL-SCNC: 134 MMOL/L (ref 136–145)
WBC # BLD AUTO: 4.5 K/UL (ref 4–11)

## 2025-02-22 PROCEDURE — 6360000002 HC RX W HCPCS: Performed by: INTERNAL MEDICINE

## 2025-02-22 PROCEDURE — 6370000000 HC RX 637 (ALT 250 FOR IP)

## 2025-02-22 PROCEDURE — 99222 1ST HOSP IP/OBS MODERATE 55: CPT

## 2025-02-22 PROCEDURE — 94760 N-INVAS EAR/PLS OXIMETRY 1: CPT

## 2025-02-22 PROCEDURE — 80048 BASIC METABOLIC PNL TOTAL CA: CPT

## 2025-02-22 PROCEDURE — 6370000000 HC RX 637 (ALT 250 FOR IP): Performed by: INTERNAL MEDICINE

## 2025-02-22 PROCEDURE — 2500000003 HC RX 250 WO HCPCS: Performed by: INTERNAL MEDICINE

## 2025-02-22 PROCEDURE — 36415 COLL VENOUS BLD VENIPUNCTURE: CPT

## 2025-02-22 PROCEDURE — 2060000000 HC ICU INTERMEDIATE R&B

## 2025-02-22 PROCEDURE — 85027 COMPLETE CBC AUTOMATED: CPT

## 2025-02-22 RX ORDER — MIDODRINE HYDROCHLORIDE 2.5 MG/1
2.5 TABLET ORAL
Qty: 90 TABLET | Refills: 0 | Status: SHIPPED | OUTPATIENT
Start: 2025-02-22

## 2025-02-22 RX ORDER — LOPERAMIDE HYDROCHLORIDE 2 MG/1
2 CAPSULE ORAL 4 TIMES DAILY PRN
Status: DISCONTINUED | OUTPATIENT
Start: 2025-02-22 | End: 2025-02-24 | Stop reason: HOSPADM

## 2025-02-22 RX ADMIN — MIDODRINE HYDROCHLORIDE 2.5 MG: 5 TABLET ORAL at 13:09

## 2025-02-22 RX ADMIN — SODIUM CHLORIDE, PRESERVATIVE FREE 10 ML: 5 INJECTION INTRAVENOUS at 20:33

## 2025-02-22 RX ADMIN — SODIUM CHLORIDE, PRESERVATIVE FREE 10 ML: 5 INJECTION INTRAVENOUS at 09:02

## 2025-02-22 RX ADMIN — ZONISAMIDE 400 MG: 100 CAPSULE ORAL at 20:33

## 2025-02-22 RX ADMIN — LACOSAMIDE 100 MG: 100 TABLET, FILM COATED ORAL at 20:32

## 2025-02-22 RX ADMIN — ACETAMINOPHEN 650 MG: 325 TABLET ORAL at 05:40

## 2025-02-22 RX ADMIN — ENOXAPARIN SODIUM 40 MG: 100 INJECTION SUBCUTANEOUS at 09:01

## 2025-02-22 RX ADMIN — LACOSAMIDE 100 MG: 100 TABLET, FILM COATED ORAL at 09:02

## 2025-02-22 RX ADMIN — FAMOTIDINE 20 MG: 20 TABLET, FILM COATED ORAL at 09:02

## 2025-02-22 RX ADMIN — ONDANSETRON 4 MG: 4 TABLET, ORALLY DISINTEGRATING ORAL at 06:09

## 2025-02-22 RX ADMIN — TRAZODONE HYDROCHLORIDE 50 MG: 50 TABLET ORAL at 20:32

## 2025-02-22 RX ADMIN — FAMOTIDINE 20 MG: 20 TABLET, FILM COATED ORAL at 20:32

## 2025-02-22 ASSESSMENT — PAIN SCALES - GENERAL
PAINLEVEL_OUTOF10: 0
PAINLEVEL_OUTOF10: 3

## 2025-02-22 NOTE — CARE COORDINATION
1426:  Additional information has been discovered regarding pt having previous guardianship and coming to the hospital from a locked unit at [facility]. Pt's mother states that pt cannot return to her home. FANNIE Bolanos, assisting with case.    Electronically signed by Codi Ag RN MSN on 2/22/2025 at 2:28 PM          1206:  Discharge order noted. Chart reviewed. Previous CM note indicates information was from assessing pt. Per pt: plan is to return home. Previously denied needs. No additional discharge needs identified at this time.    Electronically signed by Codi Ag RN MSN on 2/22/2025 at 12:06 PM
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
## 2025-02-22 NOTE — PROGRESS NOTES
Nephrology Progress Note   [facility]      Reason for consultation: Hyponatremia     History of Present Illness: Francisco Espinoza is a 53 yo male with a PMHx of epilepsy, alcoholism, substance abuse,TBI, cerebral palsy. Patient presents to  ED on 2025 with seizure activity. Patient seized again in the ED. He received IV ativan here and is currently post-ictal. Patient is unable to participate in history obtaining. Apparently patient had been drinking in excess for several days and not taking his medication. Na+ upon arrival is 121 mmol/L. SBP is soft and in the 90s upon exam.      Subjective:       Patient seen and examined. Labs and chart reviewed. Seen in the ED on RA. No family at bedside.     Subjective:    The patient has been seen and examined. Labs and chart reviewed. Transferred out of ICU. Resting in chair on RA. Will only nod yes or no  to questions and will not talk to me. Na+ dropped to 121 mmol/L last night. He was given a 500 mL NS bolus and Na+ came back up to 133 mmol/L.     There were not complications overnight.    Patient review of systems: denies SOB       Allergies:  No Known Allergies     Scheduled Meds:   midodrine  2.5 mg Oral TID WC    vitamin D  50,000 Units Oral Weekly    famotidine  20 mg Oral BID    lacosamide  100 mg Oral BID    traZODone  50 mg Oral Nightly    zonisamide  400 mg Oral Nightly    sodium chloride flush  5-40 mL IntraVENous 2 times per day    enoxaparin  40 mg SubCUTAneous Daily        sodium chloride         PRN Meds:loperamide, nitroGLYCERIN, methocarbamol, sodium chloride flush, sodium chloride, potassium chloride **OR** potassium alternative oral replacement **OR** potassium chloride, magnesium sulfate, ondansetron **OR** ondansetron, polyethylene glycol, acetaminophen **OR** acetaminophen, LORazepam    Physical Exam:    TEMPERATURE:  Current - Temp: 98.6 °F (37 °C); Max - Temp  Av.5 °F (36.9 °C)  Min: 97.8 °F (36.6 °C)  Max: 98.9 °F (37.2 °C)  RESPIRATIONS

## 2025-02-22 NOTE — CARE COORDINATION
Discharge Planning:  SW received a call from pts bedside RN stating that this pt is a long term care resident at [facility].   SW met with pt to discuss d/c planning needs.  Pt would not turn his head to look at this SW during the conversation but did confirm that he was at [facility].  Pt was unable to tell this SW how long he had been at the facility but did state that he does not want to return there.  Pt stated he will be going to his mothers home.  SW contacted pts mother and pts mother asked this SW to speak with pts sister, Erendira.  Erendira stated this pt needs to return to [facility] at d/c that pts mother is unwilling to allow pt to live with her.  Erendira stated that she was pts legal guardian but is no longer the guardian.  Erendira did not know why pt no longer has a guardian.  SW searched [facility] Probate records and was able to verify that this pt no longer has an active guardian.  SW explained this to pt that his mother is unwilling to allow pt to return to her residence.   Pt would not further engage in conversation with this SW.    SW spoke with Daniela at [facility].   Daniela confirmed that this pt was admitted to their facility from [facility] and has been there \"a couple of weeks\".  Daniela stated that pt has been at this facility in the past and stayed approximately 4 months.  Daniela reported that this pt is a secured unit as pt like to go into other residents rooms and cut up their clothes.  Daniela stated that this facility is willing to accept this pt back but the facility will need to obtain a new authorization from [facility].    FANNIE spoke with Dr. Abernathy.  At this time, FANNIE is recommending a psychiatric consult as it is unclear if pt has the capacity to make his own decisions.  Per chart, pt has a history of a TBI, alcohol abuse, cerebral palsy, epilepsy and schizophrenia.   SW will continue to follow.   DARSHAN Farias  Interval History: Fever  is resolved,blood culture no growth.    Review of Systems   Constitutional:  Positive for activity change and appetite change.   Respiratory:  Negative for apnea and chest tightness.    Gastrointestinal:  Negative for abdominal distention and abdominal pain.   Genitourinary:  Negative for difficulty urinating.   Musculoskeletal:  Negative for arthralgias.   Neurological:  Positive for weakness.     Objective:     Vital Signs (Most Recent):  Temp: 98.3 °F (36.8 °C) (06/26/24 1150)  Pulse: 94 (06/26/24 1150)  Resp: 18 (06/26/24 1150)  BP: 120/65 (06/26/24 1150)  SpO2: (!) 92 % (06/26/24 1150) Vital Signs (24h Range):  Temp:  [98 °F (36.7 °C)-98.9 °F (37.2 °C)] 98.3 °F (36.8 °C)  Pulse:  [80-98] 94  Resp:  [16-19] 18  SpO2:  [92 %-95 %] 92 %  BP: (112-136)/(58-77) 120/65     Weight: 93 kg (205 lb)  Body mass index is 31.17 kg/m².    Intake/Output Summary (Last 24 hours) at 6/26/2024 1158  Last data filed at 6/26/2024 0835  Gross per 24 hour   Intake 920 ml   Output 1100 ml   Net -180 ml         Physical Exam  Cardiovascular:      Rate and Rhythm: Normal rate.   Pulmonary:      Effort: No respiratory distress.      Breath sounds: Rales present.   Abdominal:      General: There is no distension.   Musculoskeletal:         General: No swelling.   Neurological:      Mental Status: He is alert and oriented to person, place, and time.             Significant Labs: All pertinent labs within the past 24 hours have been reviewed.  BMP:   Recent Labs   Lab 06/26/24  0445   *   *   K 4.4   CL 97   CO2 22*   BUN 25*   CREATININE 1.6*   CALCIUM 8.7     CBC:   Recent Labs   Lab 06/24/24 2311 06/26/24  0445   WBC 10.04 7.08   HGB 11.4* 10.0*   HCT 34.9* 31.2*    180     CMP:   Recent Labs   Lab 06/24/24 2311 06/26/24  0445   * 131*   K 5.8* 4.4   CL 99 97   CO2 23 22*   * 198*   BUN 32* 25*   CREATININE 1.9* 1.6*   CALCIUM 8.8 8.7   PROT 7.1  --    ALBUMIN 3.2*  --     BILITOT 0.6  --    ALKPHOS 85  --    AST 13  --    ALT 9*  --    ANIONGAP 11 12       Significant Imaging: I have reviewed all pertinent imaging results/findings within the past 24 hours.   [facility]  [phone]  Electronically signed by DARSHAN Duarte on 2/22/2025 at 2:38 PM

## 2025-02-22 NOTE — DISCHARGE SUMMARY
Name:  Francisco Espinoza /Age/Sex: 1970 (54 y.o. male)   Admit Date: 2025  Discharge Date:     MRN & CSN:  [id number] & [id number] Encounter Date :     Attending:  Naty Abernathy MD Discharging Provider: Naty Abernathy MD     Hospital Course:      Francisco Espinoza is a 54 y.o. male who presented with     Chief Complaint   Patient presents with    Seizures     Pt presents to the ED with c/c of witnessed seizure by facility staff, states was three minutes. Has hx of seizures, unsure if compliant medications. Pt is A&Ox2 (person and place).         The patient was being managed in the hospital for    Hyponatremia   Na+ 121 mmol/L upon admission. Overcorrected s/p 3% saline bolus and 500 mL NS bolus. Givef D5W and 1x dose of DDAVP. Na+ dropped again overnight. He was given a 500 mL NS bolus and Na was 136 on discharge   Discussed with patient to continue 1.5 L fluid restriction    Epilepsy with active seizure: etiology likely multifactorial and 2/2 hyponatremia, antiepileptic non-compliance   Neurology recommended to continue home medications and seizure precautions on discharge    Alcohol abuse   Patient had no signs of withdrawal   Advised to refrain from alcohol use    Hypotension   Started on midodrine 2.5mg TID      On the basis of discharge, patient reported feeling stable. The patient was found to not be in any acute distress. Further, the patient expressed appropriate understanding of, and agreement with, the discharge recommendations, medications and plan.     Follow up appointments :  Primary care physician within 1 week, neurology in 2 weeks    Consults this admission:  IP CONSULT TO NEPHROLOGY  IP CONSULT TO NEUROLOGY    Discharge Diagnosis:   Seizure (HCC)    Discharge Instruction:     Activity: activity as tolerated  Condition on discharge: Stable     Discharge Medications:        Medication List        START taking these medications      midodrine 2.5 MG tablet  Commonly known as: PROAMATINE  Take 1

## 2025-02-23 LAB
ANION GAP SERPL CALCULATED.3IONS-SCNC: 11 MMOL/L (ref 3–16)
ANION GAP SERPL CALCULATED.3IONS-SCNC: 12 MMOL/L (ref 3–16)
BUN SERPL-MCNC: 12 MG/DL (ref 7–20)
BUN SERPL-MCNC: 13 MG/DL (ref 7–20)
CALCIUM SERPL-MCNC: 9.3 MG/DL (ref 8.3–10.6)
CALCIUM SERPL-MCNC: 9.5 MG/DL (ref 8.3–10.6)
CHLORIDE SERPL-SCNC: 102 MMOL/L (ref 99–110)
CHLORIDE SERPL-SCNC: 99 MMOL/L (ref 99–110)
CO2 SERPL-SCNC: 23 MMOL/L (ref 21–32)
CO2 SERPL-SCNC: 23 MMOL/L (ref 21–32)
CREAT SERPL-MCNC: 1 MG/DL (ref 0.9–1.3)
CREAT SERPL-MCNC: 1 MG/DL (ref 0.9–1.3)
DEPRECATED RDW RBC AUTO: 13.5 % (ref 12.4–15.4)
GFR SERPLBLD CREATININE-BSD FMLA CKD-EPI: 90 ML/MIN/{1.73_M2}
GFR SERPLBLD CREATININE-BSD FMLA CKD-EPI: 90 ML/MIN/{1.73_M2}
GLUCOSE SERPL-MCNC: 98 MG/DL (ref 70–99)
GLUCOSE SERPL-MCNC: 98 MG/DL (ref 70–99)
HCT VFR BLD AUTO: 37.9 % (ref 40.5–52.5)
HGB BLD-MCNC: 12.4 G/DL (ref 13.5–17.5)
MCH RBC QN AUTO: 30.2 PG (ref 26–34)
MCHC RBC AUTO-ENTMCNC: 32.8 G/DL (ref 31–36)
MCV RBC AUTO: 92 FL (ref 80–100)
PLATELET # BLD AUTO: 234 K/UL (ref 135–450)
PMV BLD AUTO: 8.5 FL (ref 5–10.5)
POTASSIUM SERPL-SCNC: 4 MMOL/L (ref 3.5–5.1)
POTASSIUM SERPL-SCNC: 4.2 MMOL/L (ref 3.5–5.1)
RBC # BLD AUTO: 4.12 M/UL (ref 4.2–5.9)
SODIUM SERPL-SCNC: 134 MMOL/L (ref 136–145)
SODIUM SERPL-SCNC: 136 MMOL/L (ref 136–145)
WBC # BLD AUTO: 3.7 K/UL (ref 4–11)

## 2025-02-23 PROCEDURE — 80048 BASIC METABOLIC PNL TOTAL CA: CPT

## 2025-02-23 PROCEDURE — 2500000003 HC RX 250 WO HCPCS: Performed by: INTERNAL MEDICINE

## 2025-02-23 PROCEDURE — 2060000000 HC ICU INTERMEDIATE R&B

## 2025-02-23 PROCEDURE — 36415 COLL VENOUS BLD VENIPUNCTURE: CPT

## 2025-02-23 PROCEDURE — 85027 COMPLETE CBC AUTOMATED: CPT

## 2025-02-23 PROCEDURE — 94760 N-INVAS EAR/PLS OXIMETRY 1: CPT

## 2025-02-23 PROCEDURE — 6370000000 HC RX 637 (ALT 250 FOR IP)

## 2025-02-23 PROCEDURE — 6370000000 HC RX 637 (ALT 250 FOR IP): Performed by: INTERNAL MEDICINE

## 2025-02-23 PROCEDURE — 6360000002 HC RX W HCPCS: Performed by: INTERNAL MEDICINE

## 2025-02-23 RX ADMIN — METHOCARBAMOL 500 MG: 500 TABLET ORAL at 02:23

## 2025-02-23 RX ADMIN — FAMOTIDINE 20 MG: 20 TABLET, FILM COATED ORAL at 08:16

## 2025-02-23 RX ADMIN — FAMOTIDINE 20 MG: 20 TABLET, FILM COATED ORAL at 21:25

## 2025-02-23 RX ADMIN — METHOCARBAMOL 500 MG: 500 TABLET ORAL at 21:25

## 2025-02-23 RX ADMIN — ZONISAMIDE 400 MG: 100 CAPSULE ORAL at 21:26

## 2025-02-23 RX ADMIN — MIDODRINE HYDROCHLORIDE 2.5 MG: 5 TABLET ORAL at 17:12

## 2025-02-23 RX ADMIN — LACOSAMIDE 100 MG: 100 TABLET, FILM COATED ORAL at 08:15

## 2025-02-23 RX ADMIN — SODIUM CHLORIDE, PRESERVATIVE FREE 10 ML: 5 INJECTION INTRAVENOUS at 08:16

## 2025-02-23 RX ADMIN — MIDODRINE HYDROCHLORIDE 2.5 MG: 5 TABLET ORAL at 08:16

## 2025-02-23 RX ADMIN — ACETAMINOPHEN 650 MG: 325 TABLET ORAL at 17:12

## 2025-02-23 RX ADMIN — LACOSAMIDE 100 MG: 100 TABLET, FILM COATED ORAL at 21:25

## 2025-02-23 RX ADMIN — MIDODRINE HYDROCHLORIDE 2.5 MG: 5 TABLET ORAL at 11:39

## 2025-02-23 RX ADMIN — SODIUM CHLORIDE, PRESERVATIVE FREE 10 ML: 5 INJECTION INTRAVENOUS at 21:25

## 2025-02-23 RX ADMIN — LOPERAMIDE HYDROCHLORIDE 2 MG: 2 CAPSULE ORAL at 21:25

## 2025-02-23 RX ADMIN — ACETAMINOPHEN 650 MG: 325 TABLET ORAL at 02:23

## 2025-02-23 RX ADMIN — ENOXAPARIN SODIUM 40 MG: 100 INJECTION SUBCUTANEOUS at 08:16

## 2025-02-23 RX ADMIN — TRAZODONE HYDROCHLORIDE 50 MG: 50 TABLET ORAL at 21:25

## 2025-02-23 ASSESSMENT — PAIN SCALES - GENERAL
PAINLEVEL_OUTOF10: 2
PAINLEVEL_OUTOF10: 9

## 2025-02-23 ASSESSMENT — PAIN DESCRIPTION - DESCRIPTORS
DESCRIPTORS: DISCOMFORT
DESCRIPTORS: DISCOMFORT

## 2025-02-23 ASSESSMENT — PAIN DESCRIPTION - LOCATION
LOCATION: LEG
LOCATION: GENERALIZED

## 2025-02-23 ASSESSMENT — PAIN - FUNCTIONAL ASSESSMENT: PAIN_FUNCTIONAL_ASSESSMENT: PREVENTS OR INTERFERES SOME ACTIVE ACTIVITIES AND ADLS

## 2025-02-23 NOTE — PLAN OF CARE
Problem: Discharge Planning  Goal: Discharge to home or other facility with appropriate resources  Outcome: Progressing     Problem: Pain  Goal: Verbalizes/displays adequate comfort level or baseline comfort level  Outcome: Progressing     Problem: Skin/Tissue Integrity  Goal: Skin integrity remains intact  Description: 1.  Monitor for areas of redness and/or skin breakdown  2.  Assess vascular access sites hourly  3.  Every 4-6 hours minimum:  Change oxygen saturation probe site  4.  Every 4-6 hours:  If on nasal continuous positive airway pressure, respiratory therapy assess nares and determine need for appliance change or resting period  Outcome: Progressing     Problem: Safety - Adult  Goal: Free from fall injury  Outcome: Progressing     Problem: Chronic Conditions and Co-morbidities  Goal: Patient's chronic conditions and co-morbidity symptoms are monitored and maintained or improved  Outcome: Progressing     Problem: ABCDS Injury Assessment  Goal: Absence of physical injury  Outcome: Progressing

## 2025-02-23 NOTE — PROGRESS NOTES
Nephrology Progress Note   [facility]      Reason for consultation: Hyponatremia     History of Present Illness: Francisco Espinoza is a 53 yo male with a PMHx of epilepsy, alcoholism, substance abuse,TBI, cerebral palsy. Patient presents to  ED on 2025 with seizure activity. Patient seized again in the ED. He received IV ativan here and is currently post-ictal. Patient is unable to participate in history obtaining. Apparently patient had been drinking in excess for several days and not taking his medication. Na+ upon arrival is 121 mmol/L. SBP is soft and in the 90s upon exam.      Subjective:       Patient seen and examined. Labs and chart reviewed. Seen in the ED on RA. No family at bedside.     Subjective:    The patient has been seen and examined. Labs and chart reviewed. Transferred out of ICU. Resting in chair on RA. Will only nod yes or no  to questions and will not talk to me. Na+ dropped to 121 mmol/L last night. He was given a 500 mL NS bolus and Na+ came back up to 133 mmol/L.     There were not complications overnight.    Patient review of systems: denies SOB       Allergies:  No Known Allergies     Scheduled Meds:   midodrine  2.5 mg Oral TID WC    vitamin D  50,000 Units Oral Weekly    famotidine  20 mg Oral BID    lacosamide  100 mg Oral BID    traZODone  50 mg Oral Nightly    zonisamide  400 mg Oral Nightly    sodium chloride flush  5-40 mL IntraVENous 2 times per day    enoxaparin  40 mg SubCUTAneous Daily        sodium chloride         PRN Meds:loperamide, nitroGLYCERIN, methocarbamol, sodium chloride flush, sodium chloride, potassium chloride **OR** potassium alternative oral replacement **OR** potassium chloride, magnesium sulfate, ondansetron **OR** ondansetron, polyethylene glycol, acetaminophen **OR** acetaminophen, LORazepam    Physical Exam:    TEMPERATURE:  Current - Temp: 98.1 °F (36.7 °C); Max - Temp  Av.2 °F (36.8 °C)  Min: 97.7 °F (36.5 °C)  Max: 98.5 °F (36.9

## 2025-02-23 NOTE — PROGRESS NOTES
Name:  Francisco Espinoza /Age/Sex: 1970  (54 y.o. male)   MRN & CSN:  [id number] & [id number] Encounter Date/Time: 2025 12:55 PM EST   Location:  [id number]/5259-01 PCP: No primary care provider on file.     Attending:Naty Abernathy MD       Francisco Espinoza is a 54 y.o. male with  has a past medical history of Acute alcoholism ([facility]), Anemia, unspecified, Anxiety disorder, Avitaminosis D, Cerebral palsy ([facility]), Epilepsy with altered consciousness with intractable epilepsy ([facility]), Insomnia, unspecified, Neurocognitive disorder, Noncompliance with medication regimen, Psychoactive substance abuse ([facility]), Simple schizophrenia, subchronic condition ([facility]), and TBI (traumatic brain injury) ([facility]). who presents with Seizure (HCC)    Hospital Day: 5    Assessment and Plan     Hyponatremia              Na+ 121 mmol/L upon admission and was overcorrected and later continued to trend towards normal appropriately              Continue 1.5 L fluid restriction     Epilepsy with active seizure  Likely multifactorial and 2/2 hyponatremia, antiepileptic non-compliance              Neurology recommended to continue home medications and seizure precautions on discharge     Alcohol abuse              No active signs of withdrawal              Advised to refrain from alcohol use     Hypotension              Started on midodrine 2.5mg TID    Capacity   Psychiatry consulted for capacity evaluation and was deemed the patient does have the capacity to make his own decisions    following for discharge needs    Objective:       Intake/Output Summary (Last 24 hours) at 2025 1255  Last data filed at 2025 0213  Gross per 24 hour   Intake 1262 ml   Output 2750 ml   Net -1488 ml      Vitals:   Vitals:    25 0217 25 0336 25 0813 25 1138   BP: 91/71 118/84 106/80 100/70   Pulse: 76 77 75 74   Resp: 18 16 16 16   Temp: 98.3 °F (36.8 °C) 98.5 °F (36.9 °C) 98.4 °F (36.9 °C) 98.1 °F (36.7 °C)   TempSrc:  8.7 9.3  --  9.3 9.3       XR CHEST PORTABLE    Result Date: 2/22/2025  Patchy airspace and interstitial opacities. Findings may represent pulmonary edema or multifocal pneumonia.  An atypical or viral pneumonia is not excluded. Probable remote left rib fractures.     CT CHEST WO CONTRAST    Result Date: 2/19/2025  1. Bibasilar areas of ground-glass attenuation favoring atelectasis over infection/inflammation. 2. Indeterminate 2.0 cm hypodense lesion in the posterior right liver with mild peripheral wall calcification.  Findings most likely for complex cysts but is otherwise indeterminate on current exam.  Recommend correlation with remote imaging if available otherwise nonemergent liver MRI or CT can be considered. 3. Punctate nonobstructing left renal calculus.     CT CERVICAL SPINE WO CONTRAST    Result Date: 2/19/2025  No acute abnormality of the cervical spine.     CT HEAD WO CONTRAST    Result Date: 2/19/2025  No acute intracranial abnormality.     XR HIP RIGHT (2-3 VIEWS)    Result Date: 2/19/2025  1. No acute abnormality.       Naty Abernathy MD

## 2025-02-23 NOTE — PLAN OF CARE
Problem: Discharge Planning  Goal: Discharge to home or other facility with appropriate resources  2/23/2025 1002 by Bev Camejo RN  Outcome: Progressing  2/23/2025 0316 by Anna Vigil RN  Outcome: Progressing     Problem: Pain  Goal: Verbalizes/displays adequate comfort level or baseline comfort level  2/23/2025 1002 by Bev Camejo RN  Outcome: Progressing  2/23/2025 0316 by Anna Vigil RN  Outcome: Progressing     Problem: Skin/Tissue Integrity  Goal: Skin integrity remains intact  Description: 1.  Monitor for areas of redness and/or skin breakdown  2.  Assess vascular access sites hourly  3.  Every 4-6 hours minimum:  Change oxygen saturation probe site  4.  Every 4-6 hours:  If on nasal continuous positive airway pressure, respiratory therapy assess nares and determine need for appliance change or resting period  2/23/2025 1002 by Bev Camejo RN  Outcome: Progressing  2/23/2025 0316 by Anna Vigil RN  Outcome: Progressing     Problem: Safety - Adult  Goal: Free from fall injury  2/23/2025 1002 by Bev Camejo RN  Outcome: Progressing  2/23/2025 0316 by Anna Vigil RN  Outcome: Progressing     Problem: Chronic Conditions and Co-morbidities  Goal: Patient's chronic conditions and co-morbidity symptoms are monitored and maintained or improved  2/23/2025 1002 by Bev Camejo RN  Outcome: Progressing  2/23/2025 0316 by Anna Vigil RN  Outcome: Progressing     Problem: ABCDS Injury Assessment  Goal: Absence of physical injury  2/23/2025 1002 by Bev Camejo RN  Outcome: Progressing  2/23/2025 0316 by Anna Vigil RN  Outcome: Progressing

## 2025-02-23 NOTE — CARE COORDINATION
Discharge Planning:  Pt has been cleared by psychiatry.  FANNIE met with pt who is agreeable to return to [facility] once the Medicaid auth is received.  FANNIE spoke with Daniela at [facility] who stated she will likely here from insurance on 2/24.  PLAN: Return to [facility] pending Medicaid auth.   DARSHAN Farias  [phone]  Electronically signed by DARSHAN Duarte on 2/23/2025 at 10:40 AM

## 2025-02-24 VITALS
RESPIRATION RATE: 19 BRPM | WEIGHT: 138.01 LBS | BODY MASS INDEX: 20.44 KG/M2 | TEMPERATURE: 97.4 F | HEART RATE: 80 BPM | OXYGEN SATURATION: 99 % | SYSTOLIC BLOOD PRESSURE: 107 MMHG | HEIGHT: 69 IN | DIASTOLIC BLOOD PRESSURE: 80 MMHG

## 2025-02-24 LAB
ANION GAP SERPL CALCULATED.3IONS-SCNC: 11 MMOL/L (ref 3–16)
BUN SERPL-MCNC: 12 MG/DL (ref 7–20)
CALCIUM SERPL-MCNC: 9.2 MG/DL (ref 8.3–10.6)
CHLORIDE SERPL-SCNC: 102 MMOL/L (ref 99–110)
CO2 SERPL-SCNC: 21 MMOL/L (ref 21–32)
CREAT SERPL-MCNC: 1 MG/DL (ref 0.9–1.3)
DEPRECATED RDW RBC AUTO: 13.1 % (ref 12.4–15.4)
GFR SERPLBLD CREATININE-BSD FMLA CKD-EPI: 90 ML/MIN/{1.73_M2}
GLUCOSE SERPL-MCNC: 106 MG/DL (ref 70–99)
HCT VFR BLD AUTO: 37.3 % (ref 40.5–52.5)
HGB BLD-MCNC: 12.4 G/DL (ref 13.5–17.5)
MCH RBC QN AUTO: 30.4 PG (ref 26–34)
MCHC RBC AUTO-ENTMCNC: 33.4 G/DL (ref 31–36)
MCV RBC AUTO: 91 FL (ref 80–100)
PLATELET # BLD AUTO: 242 K/UL (ref 135–450)
PLATELET BLD QL SMEAR: ADEQUATE
PMV BLD AUTO: 8.5 FL (ref 5–10.5)
POTASSIUM SERPL-SCNC: 4 MMOL/L (ref 3.5–5.1)
RBC # BLD AUTO: 4.09 M/UL (ref 4.2–5.9)
SLIDE REVIEW: ABNORMAL
SODIUM SERPL-SCNC: 134 MMOL/L (ref 136–145)
WBC # BLD AUTO: 4.2 K/UL (ref 4–11)

## 2025-02-24 PROCEDURE — 2500000003 HC RX 250 WO HCPCS: Performed by: INTERNAL MEDICINE

## 2025-02-24 PROCEDURE — 92526 ORAL FUNCTION THERAPY: CPT

## 2025-02-24 PROCEDURE — 85027 COMPLETE CBC AUTOMATED: CPT

## 2025-02-24 PROCEDURE — 36415 COLL VENOUS BLD VENIPUNCTURE: CPT

## 2025-02-24 PROCEDURE — 80048 BASIC METABOLIC PNL TOTAL CA: CPT

## 2025-02-24 PROCEDURE — 6360000002 HC RX W HCPCS: Performed by: INTERNAL MEDICINE

## 2025-02-24 PROCEDURE — 6370000000 HC RX 637 (ALT 250 FOR IP): Performed by: INTERNAL MEDICINE

## 2025-02-24 PROCEDURE — 6370000000 HC RX 637 (ALT 250 FOR IP)

## 2025-02-24 PROCEDURE — 94760 N-INVAS EAR/PLS OXIMETRY 1: CPT

## 2025-02-24 RX ADMIN — LACOSAMIDE 100 MG: 100 TABLET, FILM COATED ORAL at 10:31

## 2025-02-24 RX ADMIN — METHOCARBAMOL 500 MG: 500 TABLET ORAL at 12:09

## 2025-02-24 RX ADMIN — MIDODRINE HYDROCHLORIDE 2.5 MG: 5 TABLET ORAL at 10:31

## 2025-02-24 RX ADMIN — SODIUM CHLORIDE, PRESERVATIVE FREE 10 ML: 5 INJECTION INTRAVENOUS at 10:35

## 2025-02-24 RX ADMIN — MIDODRINE HYDROCHLORIDE 2.5 MG: 5 TABLET ORAL at 12:10

## 2025-02-24 RX ADMIN — ACETAMINOPHEN 650 MG: 325 TABLET ORAL at 04:21

## 2025-02-24 RX ADMIN — FAMOTIDINE 20 MG: 20 TABLET, FILM COATED ORAL at 10:32

## 2025-02-24 RX ADMIN — ENOXAPARIN SODIUM 40 MG: 100 INJECTION SUBCUTANEOUS at 10:33

## 2025-02-24 RX ADMIN — ACETAMINOPHEN 650 MG: 325 TABLET ORAL at 10:30

## 2025-02-24 ASSESSMENT — PAIN DESCRIPTION - ORIENTATION: ORIENTATION: OTHER (COMMENT)

## 2025-02-24 ASSESSMENT — PAIN DESCRIPTION - DESCRIPTORS
DESCRIPTORS: ACHING;SORE
DESCRIPTORS: ACHING
DESCRIPTORS: ACHING

## 2025-02-24 ASSESSMENT — PAIN - FUNCTIONAL ASSESSMENT: PAIN_FUNCTIONAL_ASSESSMENT: ACTIVITIES ARE NOT PREVENTED

## 2025-02-24 ASSESSMENT — PAIN SCALES - GENERAL
PAINLEVEL_OUTOF10: 8
PAINLEVEL_OUTOF10: 2
PAINLEVEL_OUTOF10: 8
PAINLEVEL_OUTOF10: 3
PAINLEVEL_OUTOF10: 8
PAINLEVEL_OUTOF10: 9

## 2025-02-24 ASSESSMENT — PAIN DESCRIPTION - LOCATION
LOCATION: GENERALIZED

## 2025-02-24 NOTE — PROGRESS NOTES
Discharge orders acknowledged by RN . Discharge teaching completed with pt. AVS reviewed and all questions answered. Medication regimen reviewed and pt understands schedule. IV removed. Bedside monitor removed from pt. Pt vitals WNL. Pt discharged with all belongings to [facility]. Pt transported off of unit via EMS stretcher. No complications. Electronically signed by Marcie Riggins RN on 2/24/2025 at 3:20 PM

## 2025-02-24 NOTE — CARE COORDINATION
Case Management Discharge Note          Date / Time of Note: 2/24/2025 12:19 PM                  Patient Name: Francisco Espinoza   YOB: 1970  Diagnosis: Hyponatremia [E87.1]  Seizure (HCC) [R56.9]  Seizures (HCC) [R56.9]  Post-ictal state (HCC) [R56.9]   Date / Time: 2/19/2025  8:07 AM    Financial:  Payor: [employer] [place] / Plan: [employer] [place] / Product Type: *No Product type* /      Ph[facility]    [facility] [id number] [address] [facility] - P [phone] - F [phone]  [address] [place] [address]  Phone: [phone] Fax: [phone]      Assistance purchasing medications?: Potential Assistance Purchasing Medications: No  Assistance provided by Case Management: None at this time    DISCHARGE Disposition: Skilled Nursing Facility (SNF)    Nursing Facility:   Name: [facility]  Address:  [address] [place] [address]   Phone:  [phone]  Fax:  [phone]    LOC at discharge: Skilled Nursing  KATIE Completed: Yes             NURSING REPORT NUMBER: [phone] 1st floor Rm 107               NURSING FAX NUMBER: [phone]    Notification completed in HENS/PAS?:  Not Applicable    Transportation:  Transportation PLAN for discharge: EMS transportation   Mode of Transport: Ambulance stretcher - BLS  Reason for medical transport: Confused due to prior TBI, schizophrenia and requires ambulance transport due to safety, concerns for elopement  Name of Transport Company: [facility] [email or web address] [facility]  Phone: [phone]  Time of Transport: 3pm    Transport form completed: Yes    IMM Completed:   na    Additional CM Notes: Patient informed we have insurance approval for [facility] & aware of transport timing. Tried to call sister Erendira to inform but went to voicemail and her mailbox is full.   RN Marcie aware of dc plan.    The Plan for Transition of Care is related to the following treatment goals of Hyponatremia [E87.1]  Seizure (HCC)

## 2025-02-24 NOTE — PLAN OF CARE
Problem: Discharge Planning  Goal: Discharge to home or other facility with appropriate resources  Outcome: Completed     Problem: Pain  Goal: Verbalizes/displays adequate comfort level or baseline comfort level  Outcome: Completed     Problem: Skin/Tissue Integrity  Goal: Skin integrity remains intact  Description: 1.  Monitor for areas of redness and/or skin breakdown  2.  Assess vascular access sites hourly  3.  Every 4-6 hours minimum:  Change oxygen saturation probe site  4.  Every 4-6 hours:  If on nasal continuous positive airway pressure, respiratory therapy assess nares and determine need for appliance change or resting period  Outcome: Completed     Problem: Safety - Adult  Goal: Free from fall injury  Outcome: Completed     Problem: Chronic Conditions and Co-morbidities  Goal: Patient's chronic conditions and co-morbidity symptoms are monitored and maintained or improved  Outcome: Completed     Problem: ABCDS Injury Assessment  Goal: Absence of physical injury  Outcome: Completed

## 2025-02-24 NOTE — DISCHARGE SUMMARY
V2.0  Discharge Summary    Name:  Francisco Espinoza /Age/Sex: 1970 (54 y.o. male)   Admit Date: 2025  Discharge Date: 25    MRN & CSN:  [id number] & [id number] Encounter Date and Time 25 12:53 PM EST    Attending:  Ping García MD Discharging Provider: Ping García MD       Hospital Course:     Brief HPI: Francisco Espinoza is a 54 y.o. male with pmh of alcohol use disorder, anxiety disorder, anemia, neurocognitive disorder, epilepsy who presents with Seizure (HCC). History of epilepsy with breakthrough seizures thought to be multifactorial likely due to noncompliance with antiepileptics and hyponatremia.  Seen by neurology who is recommended to continue home medications for seizure.  Currently on Lacosamide 100 mg BID and Zonisamide 400 HS.    Alcohol use disorder, not in acute withdrawal. Hyponatremia, sodium 121 on presentation, gradually corrected, currently 134. On fluid restriction.        Brief Problem Based Course:   Hyponatremia  Epilepsy with breakthrough seizures  Hypertension  Alcohol use disorder      The patient expressed appropriate understanding of, and agreement with the discharge recommendations, medications, and plan.     Consults this admission:  IP CONSULT TO NEPHROLOGY  IP CONSULT TO NEUROLOGY  IP CONSULT TO TELE-PSYCH PROVIDER    Discharge Diagnosis:   Seizure (HCC)        Discharge Instruction:   Follow up appointments: PCP  Primary care physician: No primary care provider on file. within 1 week  Diet: regular diet   Activity: activity as tolerated  Disposition: Discharged to:   []Home, [][facility], [x]SNF, []Acute Rehab, []Hospice   Condition on discharge: Stable  Labs and Tests to be Followed up as an outpatient by PCP or Specialist:     Discharge Medications:        Medication List        START taking these medications      midodrine 2.5 MG tablet  Commonly known as: PROAMATINE  Take 1 tablet by mouth 3 times daily (with meals)            CONTINUE taking these medications   control, iterative reconstruction, and/or weight based adjustment of the mA/kV was utilized to reduce the radiation dose to as low as reasonably achievable. COMPARISON: None. HISTORY: ORDERING SYSTEM PROVIDED HISTORY: witnessed seizure, fall, struck head TECHNOLOGIST PROVIDED HISTORY: If patient is on cardiac monitor and/or pulse ox, they may be taken off cardiac monitor and pulse ox, left on O2 if currently on. All monitors reattached when patient returns to room. Has a \"code stroke\" or \"stroke alert\" been called?->No Reason for exam:->witnessed seizure, fall, struck head Reason for Exam: witnessed seizure, fall, struck head FINDINGS: BRAIN/VENTRICLES: There is no acute intracranial hemorrhage, mass effect or midline shift.  No abnormal extra-axial fluid collection.  The gray-white differentiation is maintained without evidence of an acute infarct.  There is no evidence of hydrocephalus. ORBITS: The visualized portion of the orbits demonstrate no acute abnormality. SINUSES: The visualized paranasal sinuses and mastoid air cells demonstrate no acute abnormality. SOFT TISSUES/SKULL:  No acute abnormality of the visualized skull or soft tissues.     No acute intracranial abnormality.     XR HIP RIGHT (2-3 VIEWS)    Result Date: 2/19/2025  EXAMINATION: TWO XRAY VIEWS OF THE RIGHT HIP 2/19/2025 8:36 am COMPARISON: None. HISTORY: ORDERING SYSTEM PROVIDED HISTORY: injury TECHNOLOGIST PROVIDED HISTORY: Reason for exam:->injury Reason for Exam: injury FINDINGS: There is no acute fracture or dislocation.  Is an old healed right inferior pubic ramus fracture.  The bones are slightly demineralized.  There are no bony destructive lesions.  Status post right femoral neck pinning, right femoral intramedullary grecia and left femoral intramedullary grecia and dynamic pin.  There is mild right hip osteoarthritis.     1. No acute abnormality.       CBC:   Recent Labs     02/22/25  0522 02/23/25  0530 02/24/25  0521   WBC 4.5 3.7* 4.2

## 2025-02-24 NOTE — PROGRESS NOTES
[facility]  SLP DYSPHAGIA THERAPY  DISCHARGE SUMMARY    Patient: Francisco Espinoza   : 1970   MRN: [id number]    Treatment Date: 2025   Admitting Diagnosis:   Hyponatremia [E87.1]  Seizure (HCC) [R56.9]  Seizures (HCC) [R56.9]  Post-ictal state (HCC) [R56.9]   has a past medical history of Acute alcoholism (HCC), Anemia, unspecified, Anxiety disorder, Avitaminosis D, Cerebral palsy (HCC), Epilepsy with altered consciousness with intractable epilepsy (HCC), Insomnia, unspecified, Neurocognitive disorder, Noncompliance with medication regimen, Psychoactive substance abuse (HCC), Simple schizophrenia, subchronic condition (HCC), and TBI (traumatic brain injury) ([facility]).   has no past surgical history on file.  Allergies: NKA  Dysphagia History including instrumental assessment: 2025 CSE (OSH) rec for regular/thin  GI history: prior GI bleeds, gastritis, esophagitis  Baseline/Prior Level of Function: Living Status: admitted from [facility]; Baseline diet: regular/thin    Onset: 2025     Treatment Diagnosis: Oropharyngeal dysphagia    CURRENT ENCOUNTER DIAGNOSTICS/COURSE OF ADMISSION     2025 admitted with c/o seizures. MD ADMISSION H&P HPI: \"54 y.o. male who presented to [facility] with seizure activity, had another episode in emergency department continue to be confused thereafter, found to have hyponatremia nephrology consulted.  To note the patient apparently was admitted recently to   for multiple breakthrough seizures, at that time discharged on Vimpat and Zonegran, discussed with neurology who is okay to be admitted at [facility] patient will be admitted to ICU discussed with EDMD\"   Consults noted: Neurology, Nephrology  2025 CSE rec regular/thin     Most Recent Imagin2025 CXR: IMPRESSION: Patchy airspace and interstitial opacities. Findings may represent pulmonary edema or multifocal pneumonia.  An atypical or viral pneumonia is not excluded. Probable remote left rib

## 2025-02-24 NOTE — CARE COORDINATION
JOHN sena/ Darron at [facility] to check on status of pre cert, await return call.    Electronically signed by Codi March RN Case Management on 2/24/2025 at 12:04 PM    Spoke to Darron at [facility], informs me pre cert has been obtained and patient able to admit today.    Electronically signed by Codi March RN on 2/24/2025 at 12:13 PM

## 2025-02-24 NOTE — PLAN OF CARE
Problem: Pain  Goal: Verbalizes/displays adequate comfort level or baseline comfort level  2/23/2025 2202 by Jeniffer Flores RN  Outcome: Progressing  2/23/2025 2201 by Jeniffer Flores RN  Outcome: Progressing  2/23/2025 1002 by Bev Camejo RN  Outcome: Progressing     Problem: Skin/Tissue Integrity  Goal: Skin integrity remains intact  Description: 1.  Monitor for areas of redness and/or skin breakdown  2.  Assess vascular access sites hourly  3.  Every 4-6 hours minimum:  Change oxygen saturation probe site  4.  Every 4-6 hours:  If on nasal continuous positive airway pressure, respiratory therapy assess nares and determine need for appliance change or resting period  2/23/2025 2202 by Jeniffer Flores RN  Outcome: Progressing  2/23/2025 2201 by Jeniffer Flores RN  Outcome: Progressing  2/23/2025 1002 by Bev Camejo RN  Outcome: Progressing  Flowsheets (Taken 2/23/2025 0900)  Skin Integrity Remains Intact: Monitor for areas of redness and/or skin breakdown     Problem: Safety - Adult  Goal: Free from fall injury  2/23/2025 2202 by Jeniffer Flores RN  Outcome: Progressing  2/23/2025 2201 by Jeniffer Flores RN  Outcome: Progressing  2/23/2025 1002 by Bev Camejo RN  Outcome: Progressing     Problem: Chronic Conditions and Co-morbidities  Goal: Patient's chronic conditions and co-morbidity symptoms are monitored and maintained or improved  2/23/2025 2202 by Jeniffer Flores RN  Outcome: Progressing  2/23/2025 2201 by Jeniffer Flores RN  Outcome: Progressing  2/23/2025 1002 by Bev Camejo RN  Outcome: Progressing  Flowsheets (Taken 2/23/2025 0900)  Care Plan - Patient's Chronic Conditions and Co-Morbidity Symptoms are Monitored and Maintained or Improved: Monitor and assess patient's chronic conditions and comorbid symptoms for stability, deterioration, or improvement

## 2025-02-24 NOTE — PLAN OF CARE
Problem: Pain  Goal: Verbalizes/displays adequate comfort level or baseline comfort level  2/23/2025 2201 by Jeniffer Flores RN  Outcome: Progressing  2/23/2025 1002 by Bev Camejo RN  Outcome: Progressing     Problem: Skin/Tissue Integrity  Goal: Skin integrity remains intact  Description: 1.  Monitor for areas of redness and/or skin breakdown  2.  Assess vascular access sites hourly  3.  Every 4-6 hours minimum:  Change oxygen saturation probe site  4.  Every 4-6 hours:  If on nasal continuous positive airway pressure, respiratory therapy assess nares and determine need for appliance change or resting period  2/23/2025 2201 by Jeniffer Flores RN  Outcome: Progressing  2/23/2025 1002 by Bev Camejo RN  Outcome: Progressing  Flowsheets (Taken 2/23/2025 0900)  Skin Integrity Remains Intact: Monitor for areas of redness and/or skin breakdown     Problem: Safety - Adult  Goal: Free from fall injury  2/23/2025 2201 by Jeniffer Flores RN  Outcome: Progressing  2/23/2025 1002 by Bev Camejo RN  Outcome: Progressing

## 2025-03-07 NOTE — PROGRESS NOTES
Physician Progress Note      PATIENT:               ULISES GALARZA  [facility] #:                  [id number]  :                       1970  ADMIT DATE:       2025 8:07 AM  DISCH DATE:        2025 3:21 PM  RESPONDING  PROVIDER #:        Wagner Davies MD          QUERY TEXT:    Patient admitted with seizure. Documentation reflects Acute encephalopathy   postictal in H&P on 25.  If possible, please document in the progress   notes and discharge summary if Acute encephalopathy postictal  was:    The medical record reflects the following:  Risk Factors: Hx- epilepsy, etoh abuse, substance abuse,TBI, cerebral palsy  Clinical Indicators: Na 121......Per H&P on 25-Acute encephalopathy   postictal  Treatment: NS 3% IV, Keppra IV  Options provided:  -- Acute encephalopathy postictal ruled out after study  -- Acute encephalopathy postictal confirmed after study, Please specify type   of encephalopathy  -- Other - I will add my own diagnosis  -- Disagree - Not applicable / Not valid  -- Disagree - Clinically unable to determine / Unknown  -- Refer to Clinical Documentation Reviewer    PROVIDER RESPONSE TEXT:    Acute encephalopathy postictal confirmed after study.    Query created by: Rubia Beard on 2025 8:29 AM      Electronically signed by:  Wagner Davies MD 3/7/2025 10:41 AM